# Patient Record
Sex: FEMALE | Race: WHITE | NOT HISPANIC OR LATINO | Employment: OTHER | URBAN - NONMETROPOLITAN AREA
[De-identification: names, ages, dates, MRNs, and addresses within clinical notes are randomized per-mention and may not be internally consistent; named-entity substitution may affect disease eponyms.]

---

## 2019-10-23 ENCOUNTER — NON-PROVIDER VISIT (OUTPATIENT)
Dept: MEDICAL GROUP | Facility: CLINIC | Age: 74
End: 2019-10-23
Payer: COMMERCIAL

## 2019-10-23 DIAGNOSIS — H33.051 TOTAL RETINAL DETACHMENT OF RIGHT EYE: ICD-10-CM

## 2019-10-23 PROCEDURE — 36415 COLL VENOUS BLD VENIPUNCTURE: CPT | Performed by: FAMILY MEDICINE

## 2019-10-23 PROCEDURE — 99000 SPECIMEN HANDLING OFFICE-LAB: CPT | Performed by: FAMILY MEDICINE

## 2019-12-04 ENCOUNTER — NON-PROVIDER VISIT (OUTPATIENT)
Dept: MEDICAL GROUP | Facility: CLINIC | Age: 74
End: 2019-12-04
Payer: COMMERCIAL

## 2019-12-04 PROCEDURE — 36415 COLL VENOUS BLD VENIPUNCTURE: CPT | Performed by: FAMILY MEDICINE

## 2019-12-04 PROCEDURE — 99000 SPECIMEN HANDLING OFFICE-LAB: CPT | Performed by: FAMILY MEDICINE

## 2023-07-23 ENCOUNTER — TELEPHONE (OUTPATIENT)
Dept: CARDIOLOGY | Facility: MEDICAL CENTER | Age: 78
End: 2023-07-23
Payer: COMMERCIAL

## 2023-07-24 ENCOUNTER — HOSPITAL ENCOUNTER (OUTPATIENT)
Dept: RADIOLOGY | Facility: MEDICAL CENTER | Age: 78
End: 2023-07-24

## 2023-07-24 ENCOUNTER — HOSPITAL ENCOUNTER (OUTPATIENT)
Facility: MEDICAL CENTER | Age: 78
End: 2023-07-27
Attending: EMERGENCY MEDICINE | Admitting: INTERNAL MEDICINE
Payer: COMMERCIAL

## 2023-07-24 ENCOUNTER — APPOINTMENT (OUTPATIENT)
Dept: RADIOLOGY | Facility: MEDICAL CENTER | Age: 78
End: 2023-07-24
Payer: COMMERCIAL

## 2023-07-24 ENCOUNTER — APPOINTMENT (OUTPATIENT)
Dept: RADIOLOGY | Facility: MEDICAL CENTER | Age: 78
End: 2023-07-24
Attending: EMERGENCY MEDICINE
Payer: COMMERCIAL

## 2023-07-24 DIAGNOSIS — R20.0 LEFT SIDED NUMBNESS: ICD-10-CM

## 2023-07-24 DIAGNOSIS — R79.89 ELEVATED TROPONIN: ICD-10-CM

## 2023-07-24 DIAGNOSIS — I24.89 DEMAND ISCHEMIA (HCC): ICD-10-CM

## 2023-07-24 PROBLEM — I10 HYPERTENSION: Status: ACTIVE | Noted: 2023-07-24

## 2023-07-24 PROBLEM — E78.5 HYPERLIPIDEMIA: Status: ACTIVE | Noted: 2023-07-24

## 2023-07-24 PROBLEM — E11.9 DM (DIABETES MELLITUS) (HCC): Status: ACTIVE | Noted: 2023-07-24

## 2023-07-24 LAB
ALBUMIN SERPL BCP-MCNC: 3.9 G/DL (ref 3.2–4.9)
ALBUMIN/GLOB SERPL: 1.2 G/DL
ALP SERPL-CCNC: 64 U/L (ref 30–99)
ALT SERPL-CCNC: 16 U/L (ref 2–50)
ANION GAP SERPL CALC-SCNC: 10 MMOL/L (ref 7–16)
APPEARANCE UR: CLEAR
AST SERPL-CCNC: 28 U/L (ref 12–45)
BACTERIA #/AREA URNS HPF: NEGATIVE /HPF
BASOPHILS # BLD AUTO: 0.5 % (ref 0–1.8)
BASOPHILS # BLD: 0.04 K/UL (ref 0–0.12)
BILIRUB SERPL-MCNC: 0.3 MG/DL (ref 0.1–1.5)
BILIRUB UR QL STRIP.AUTO: NEGATIVE
BUN SERPL-MCNC: 13 MG/DL (ref 8–22)
CALCIUM ALBUM COR SERPL-MCNC: 9.1 MG/DL (ref 8.5–10.5)
CALCIUM SERPL-MCNC: 9 MG/DL (ref 8.5–10.5)
CHLORIDE SERPL-SCNC: 102 MMOL/L (ref 96–112)
CO2 SERPL-SCNC: 25 MMOL/L (ref 20–33)
COLOR UR: YELLOW
CREAT SERPL-MCNC: 0.81 MG/DL (ref 0.5–1.4)
EKG IMPRESSION: NORMAL
EOSINOPHIL # BLD AUTO: 0.11 K/UL (ref 0–0.51)
EOSINOPHIL NFR BLD: 1.3 % (ref 0–6.9)
EPI CELLS #/AREA URNS HPF: NEGATIVE /HPF
ERYTHROCYTE [DISTWIDTH] IN BLOOD BY AUTOMATED COUNT: 50.4 FL (ref 35.9–50)
GFR SERPLBLD CREATININE-BSD FMLA CKD-EPI: 74 ML/MIN/1.73 M 2
GLOBULIN SER CALC-MCNC: 3.3 G/DL (ref 1.9–3.5)
GLUCOSE BLD STRIP.AUTO-MCNC: 158 MG/DL (ref 65–99)
GLUCOSE SERPL-MCNC: 125 MG/DL (ref 65–99)
GLUCOSE UR STRIP.AUTO-MCNC: NEGATIVE MG/DL
HCT VFR BLD AUTO: 41.5 % (ref 37–47)
HGB BLD-MCNC: 13.2 G/DL (ref 12–16)
HYALINE CASTS #/AREA URNS LPF: ABNORMAL /LPF
IMM GRANULOCYTES # BLD AUTO: 0.02 K/UL (ref 0–0.11)
IMM GRANULOCYTES NFR BLD AUTO: 0.2 % (ref 0–0.9)
KETONES UR STRIP.AUTO-MCNC: NEGATIVE MG/DL
LEUKOCYTE ESTERASE UR QL STRIP.AUTO: NEGATIVE
LYMPHOCYTES # BLD AUTO: 2.21 K/UL (ref 1–4.8)
LYMPHOCYTES NFR BLD: 26.8 % (ref 22–41)
MCH RBC QN AUTO: 29.9 PG (ref 27–33)
MCHC RBC AUTO-ENTMCNC: 31.8 G/DL (ref 32.2–35.5)
MCV RBC AUTO: 93.9 FL (ref 81.4–97.8)
MICRO URNS: ABNORMAL
MONOCYTES # BLD AUTO: 0.48 K/UL (ref 0–0.85)
MONOCYTES NFR BLD AUTO: 5.8 % (ref 0–13.4)
NEUTROPHILS # BLD AUTO: 5.4 K/UL (ref 1.82–7.42)
NEUTROPHILS NFR BLD: 65.4 % (ref 44–72)
NITRITE UR QL STRIP.AUTO: NEGATIVE
NRBC # BLD AUTO: 0 K/UL
NRBC BLD-RTO: 0 /100 WBC (ref 0–0.2)
PH UR STRIP.AUTO: 5.5 [PH] (ref 5–8)
PLATELET # BLD AUTO: 226 K/UL (ref 164–446)
PMV BLD AUTO: 11 FL (ref 9–12.9)
POTASSIUM SERPL-SCNC: 4.1 MMOL/L (ref 3.6–5.5)
PROT SERPL-MCNC: 7.2 G/DL (ref 6–8.2)
PROT UR QL STRIP: NEGATIVE MG/DL
RBC # BLD AUTO: 4.42 M/UL (ref 4.2–5.4)
RBC # URNS HPF: ABNORMAL /HPF
RBC UR QL AUTO: ABNORMAL
SODIUM SERPL-SCNC: 137 MMOL/L (ref 135–145)
SP GR UR STRIP.AUTO: 1.01
TROPONIN T SERPL-MCNC: 44 NG/L (ref 6–19)
TROPONIN T SERPL-MCNC: 44 NG/L (ref 6–19)
UROBILINOGEN UR STRIP.AUTO-MCNC: 0.2 MG/DL
WBC # BLD AUTO: 8.3 K/UL (ref 4.8–10.8)
WBC #/AREA URNS HPF: ABNORMAL /HPF

## 2023-07-24 PROCEDURE — 93005 ELECTROCARDIOGRAM TRACING: CPT | Performed by: EMERGENCY MEDICINE

## 2023-07-24 PROCEDURE — A9270 NON-COVERED ITEM OR SERVICE: HCPCS | Performed by: STUDENT IN AN ORGANIZED HEALTH CARE EDUCATION/TRAINING PROGRAM

## 2023-07-24 PROCEDURE — 36415 COLL VENOUS BLD VENIPUNCTURE: CPT

## 2023-07-24 PROCEDURE — 82962 GLUCOSE BLOOD TEST: CPT

## 2023-07-24 PROCEDURE — G0378 HOSPITAL OBSERVATION PER HR: HCPCS

## 2023-07-24 PROCEDURE — 96372 THER/PROPH/DIAG INJ SC/IM: CPT

## 2023-07-24 PROCEDURE — 93005 ELECTROCARDIOGRAM TRACING: CPT

## 2023-07-24 PROCEDURE — 99222 1ST HOSP IP/OBS MODERATE 55: CPT | Performed by: INTERNAL MEDICINE

## 2023-07-24 PROCEDURE — 700111 HCHG RX REV CODE 636 W/ 250 OVERRIDE (IP): Performed by: STUDENT IN AN ORGANIZED HEALTH CARE EDUCATION/TRAINING PROGRAM

## 2023-07-24 PROCEDURE — 99285 EMERGENCY DEPT VISIT HI MDM: CPT

## 2023-07-24 PROCEDURE — 81001 URINALYSIS AUTO W/SCOPE: CPT

## 2023-07-24 PROCEDURE — 85025 COMPLETE CBC W/AUTO DIFF WBC: CPT

## 2023-07-24 PROCEDURE — 700102 HCHG RX REV CODE 250 W/ 637 OVERRIDE(OP)

## 2023-07-24 PROCEDURE — 84484 ASSAY OF TROPONIN QUANT: CPT

## 2023-07-24 PROCEDURE — 99223 1ST HOSP IP/OBS HIGH 75: CPT | Mod: GC | Performed by: INTERNAL MEDICINE

## 2023-07-24 PROCEDURE — 700102 HCHG RX REV CODE 250 W/ 637 OVERRIDE(OP): Performed by: STUDENT IN AN ORGANIZED HEALTH CARE EDUCATION/TRAINING PROGRAM

## 2023-07-24 PROCEDURE — 80053 COMPREHEN METABOLIC PANEL: CPT

## 2023-07-24 PROCEDURE — A9270 NON-COVERED ITEM OR SERVICE: HCPCS

## 2023-07-24 RX ORDER — ATORVASTATIN CALCIUM 40 MG/1
40 TABLET, FILM COATED ORAL EVERY EVENING
Status: DISCONTINUED | OUTPATIENT
Start: 2023-07-24 | End: 2023-07-27 | Stop reason: HOSPADM

## 2023-07-24 RX ORDER — PIOGLITAZONEHYDROCHLORIDE 30 MG/1
30 TABLET ORAL DAILY
COMMUNITY

## 2023-07-24 RX ORDER — ONDANSETRON 4 MG/1
4 TABLET, ORALLY DISINTEGRATING ORAL EVERY 4 HOURS PRN
Status: DISCONTINUED | OUTPATIENT
Start: 2023-07-24 | End: 2023-07-27 | Stop reason: HOSPADM

## 2023-07-24 RX ORDER — RAMIPRIL 5 MG/1
10 CAPSULE ORAL
Status: DISCONTINUED | OUTPATIENT
Start: 2023-07-24 | End: 2023-07-27 | Stop reason: HOSPADM

## 2023-07-24 RX ORDER — AMLODIPINE BESYLATE 2.5 MG/1
2.5 TABLET ORAL
Status: DISCONTINUED | OUTPATIENT
Start: 2023-07-24 | End: 2023-07-27 | Stop reason: HOSPADM

## 2023-07-24 RX ORDER — MULTIVIT WITH MINERALS/LUTEIN
1000 TABLET ORAL DAILY
COMMUNITY

## 2023-07-24 RX ORDER — BISACODYL 10 MG
10 SUPPOSITORY, RECTAL RECTAL
Status: DISCONTINUED | OUTPATIENT
Start: 2023-07-24 | End: 2023-07-27 | Stop reason: HOSPADM

## 2023-07-24 RX ORDER — LORATADINE 10 MG/1
10 TABLET ORAL DAILY
COMMUNITY

## 2023-07-24 RX ORDER — ONDANSETRON 2 MG/ML
4 INJECTION INTRAMUSCULAR; INTRAVENOUS EVERY 4 HOURS PRN
Status: DISCONTINUED | OUTPATIENT
Start: 2023-07-24 | End: 2023-07-27 | Stop reason: HOSPADM

## 2023-07-24 RX ORDER — HYDROCHLOROTHIAZIDE 25 MG/1
25 TABLET ORAL
Status: DISCONTINUED | OUTPATIENT
Start: 2023-07-24 | End: 2023-07-27 | Stop reason: HOSPADM

## 2023-07-24 RX ORDER — HEPARIN SODIUM 5000 [USP'U]/ML
5000 INJECTION, SOLUTION INTRAVENOUS; SUBCUTANEOUS EVERY 8 HOURS
Status: DISCONTINUED | OUTPATIENT
Start: 2023-07-24 | End: 2023-07-27 | Stop reason: HOSPADM

## 2023-07-24 RX ORDER — LEVOTHYROXINE SODIUM 0.03 MG/1
25 TABLET ORAL
COMMUNITY

## 2023-07-24 RX ORDER — SIMVASTATIN 20 MG
20 TABLET ORAL NIGHTLY
Status: ON HOLD | COMMUNITY
End: 2023-07-27

## 2023-07-24 RX ORDER — LABETALOL HYDROCHLORIDE 5 MG/ML
10 INJECTION, SOLUTION INTRAVENOUS EVERY 4 HOURS PRN
Status: DISCONTINUED | OUTPATIENT
Start: 2023-07-24 | End: 2023-07-24

## 2023-07-24 RX ORDER — GLIPIZIDE 5 MG/1
5 TABLET ORAL 2 TIMES DAILY
COMMUNITY

## 2023-07-24 RX ORDER — ASPIRIN 81 MG/1
81 TABLET ORAL DAILY
Status: DISCONTINUED | OUTPATIENT
Start: 2023-07-24 | End: 2023-07-27

## 2023-07-24 RX ORDER — RAMIPRIL 10 MG/1
10 CAPSULE ORAL
COMMUNITY

## 2023-07-24 RX ORDER — ASPIRIN 81 MG/1
81 TABLET ORAL
COMMUNITY

## 2023-07-24 RX ORDER — POLYETHYLENE GLYCOL 3350 17 G/17G
1 POWDER, FOR SOLUTION ORAL
Status: DISCONTINUED | OUTPATIENT
Start: 2023-07-24 | End: 2023-07-27 | Stop reason: HOSPADM

## 2023-07-24 RX ORDER — ACETAMINOPHEN 325 MG/1
650 TABLET ORAL EVERY 6 HOURS PRN
Status: DISCONTINUED | OUTPATIENT
Start: 2023-07-24 | End: 2023-07-27 | Stop reason: HOSPADM

## 2023-07-24 RX ORDER — INSULIN LISPRO 100 [IU]/ML
2-9 INJECTION, SOLUTION INTRAVENOUS; SUBCUTANEOUS
Status: DISCONTINUED | OUTPATIENT
Start: 2023-07-24 | End: 2023-07-27 | Stop reason: HOSPADM

## 2023-07-24 RX ORDER — AMOXICILLIN 250 MG
2 CAPSULE ORAL 2 TIMES DAILY
Status: DISCONTINUED | OUTPATIENT
Start: 2023-07-24 | End: 2023-07-27 | Stop reason: HOSPADM

## 2023-07-24 RX ORDER — M-VIT,TX,IRON,MINS/CALC/FOLIC 27MG-0.4MG
1 TABLET ORAL DAILY
COMMUNITY

## 2023-07-24 RX ORDER — HYDRALAZINE HYDROCHLORIDE 20 MG/ML
10 INJECTION INTRAMUSCULAR; INTRAVENOUS EVERY 6 HOURS PRN
Status: DISCONTINUED | OUTPATIENT
Start: 2023-07-24 | End: 2023-07-27 | Stop reason: HOSPADM

## 2023-07-24 RX ORDER — VITAMIN B COMPLEX
1000 TABLET ORAL DAILY
COMMUNITY

## 2023-07-24 RX ORDER — TIMOLOL MALEATE 5 MG/ML
1 SOLUTION/ DROPS OPHTHALMIC 2 TIMES DAILY
COMMUNITY

## 2023-07-24 RX ORDER — HYDROCHLOROTHIAZIDE 25 MG/1
25 TABLET ORAL DAILY
COMMUNITY

## 2023-07-24 RX ORDER — DEXTROSE MONOHYDRATE 25 G/50ML
25 INJECTION, SOLUTION INTRAVENOUS
Status: DISCONTINUED | OUTPATIENT
Start: 2023-07-24 | End: 2023-07-27 | Stop reason: HOSPADM

## 2023-07-24 RX ADMIN — HEPARIN SODIUM 5000 UNITS: 5000 INJECTION, SOLUTION INTRAVENOUS; SUBCUTANEOUS at 21:35

## 2023-07-24 RX ADMIN — ASPIRIN 81 MG: 81 TABLET, COATED ORAL at 15:33

## 2023-07-24 RX ADMIN — AMLODIPINE BESYLATE 2.5 MG: 5 TABLET ORAL at 15:45

## 2023-07-24 RX ADMIN — RAMIPRIL 10 MG: 5 CAPSULE ORAL at 15:28

## 2023-07-24 RX ADMIN — HYDROCHLOROTHIAZIDE 25 MG: 25 TABLET ORAL at 15:28

## 2023-07-24 RX ADMIN — ATORVASTATIN CALCIUM 40 MG: 40 TABLET, FILM COATED ORAL at 17:57

## 2023-07-24 RX ADMIN — HEPARIN SODIUM 5000 UNITS: 5000 INJECTION, SOLUTION INTRAVENOUS; SUBCUTANEOUS at 15:28

## 2023-07-24 ASSESSMENT — PATIENT HEALTH QUESTIONNAIRE - PHQ9
SUM OF ALL RESPONSES TO PHQ9 QUESTIONS 1 AND 2: 0
2. FEELING DOWN, DEPRESSED, IRRITABLE, OR HOPELESS: NOT AT ALL
1. LITTLE INTEREST OR PLEASURE IN DOING THINGS: NOT AT ALL
1. LITTLE INTEREST OR PLEASURE IN DOING THINGS: NOT AT ALL
SUM OF ALL RESPONSES TO PHQ9 QUESTIONS 1 AND 2: 0
2. FEELING DOWN, DEPRESSED, IRRITABLE, OR HOPELESS: NOT AT ALL

## 2023-07-24 ASSESSMENT — LIFESTYLE VARIABLES
EVER FELT BAD OR GUILTY ABOUT YOUR DRINKING: NO
HAVE YOU EVER FELT YOU SHOULD CUT DOWN ON YOUR DRINKING: NO
DOES PATIENT WANT TO STOP DRINKING: NO
TOTAL SCORE: 0
CONSUMPTION TOTAL: NEGATIVE
ALCOHOL_USE: NO
TOTAL SCORE: 0
DO YOU DRINK ALCOHOL: NO
TOTAL SCORE: 0
EVER HAD A DRINK FIRST THING IN THE MORNING TO STEADY YOUR NERVES TO GET RID OF A HANGOVER: NO
ON A TYPICAL DAY WHEN YOU DRINK ALCOHOL HOW MANY DRINKS DO YOU HAVE: 0
SUBSTANCE_ABUSE: 0
HAVE PEOPLE ANNOYED YOU BY CRITICIZING YOUR DRINKING: NO
HOW MANY TIMES IN THE PAST YEAR HAVE YOU HAD 5 OR MORE DRINKS IN A DAY: 0
AVERAGE NUMBER OF DAYS PER WEEK YOU HAVE A DRINK CONTAINING ALCOHOL: 0

## 2023-07-24 ASSESSMENT — ENCOUNTER SYMPTOMS
COUGH: 0
HEADACHES: 0
WHEEZING: 0
NERVOUS/ANXIOUS: 1
DIAPHORESIS: 0
DIZZINESS: 0
SPEECH CHANGE: 0
ABDOMINAL PAIN: 0
CHILLS: 0
ORTHOPNEA: 0
NAUSEA: 0
FEVER: 0
WEIGHT LOSS: 0
FOCAL WEAKNESS: 0
PALPITATIONS: 0
SENSORY CHANGE: 1
SHORTNESS OF BREATH: 0
VOMITING: 0
BLOOD IN STOOL: 0
WEAKNESS: 0
MYALGIAS: 0
PHOTOPHOBIA: 0

## 2023-07-24 ASSESSMENT — COGNITIVE AND FUNCTIONAL STATUS - GENERAL
MOBILITY SCORE: 22
STANDING UP FROM CHAIR USING ARMS: A LITTLE
DAILY ACTIVITIY SCORE: 23
TOILETING: A LITTLE
SUGGESTED CMS G CODE MODIFIER MOBILITY: CJ
SUGGESTED CMS G CODE MODIFIER DAILY ACTIVITY: CI
CLIMB 3 TO 5 STEPS WITH RAILING: A LITTLE

## 2023-07-24 ASSESSMENT — PAIN DESCRIPTION - PAIN TYPE: TYPE: ACUTE PAIN

## 2023-07-24 ASSESSMENT — FIBROSIS 4 INDEX
FIB4 SCORE: 2.42
FIB4 SCORE: 2.42

## 2023-07-24 NOTE — ASSESSMENT & PLAN NOTE
Patient had elevated troponin at outside hospital and her initial troponin here was elevated at 44.  Her initial blood pressures in the ED were constantly elevated above 180 for systolic. This is most likely as a result of demand ischemia from hypertensive emergency. Her EKG demonstrated no ischemic changes. Patient also complains of left sided numbness but her neurological exam is unremarkable and outside head CT and CT angio neck were negative for acute processes. This is most likely as a result of her elevated blood pressures but we will get an MRI brain to rule out a thalamic stroke.   - MRI brain to rule out thalamic infarct   - Hydralazine 10mg PRN for systolic blood pressures over 180  - Her home blood pressure regimen includes ramipril and HCTZ. We will continue those and also start amlodipine 2.5mg.   - Atorvastatin and aspirin   - She reports exertional dyspnea after walking for short distances. Echo demonstrated normal systolic function with mild diastolic dysfunction.   - Cardiology consulted and they do not believe this is concerning for ACS and have signed off.

## 2023-07-24 NOTE — TELEPHONE ENCOUNTER
[facility] CARDIOLOGY TRIAGE REPORT  Facility: [place], [place]  Physician: John Henao MD, ERP  Pertinent history & patient course: 78-year-old female from [facility] with hypertension, dyslipidemia (on simvastatin), diabetes mellitus and obesity presenting with left-sided paresthesias with associated symptoms into her left arm and left leg.  No typical ischemic angina pectoris or shortness of breath symptoms.  In the ER /80.  Room air O2 sat normal.  Hemoglobin 15.  BUN 16.  Creatinine 1.02.  Troponin 0.06 and 0.08 (normal 0-0.028.  EKG showed sinus rhythm, rate 66, low voltage precordial leads otherwise unremarkabl with no ischemic abnormalities.  The patient received aspirin in the ER.  Currently having no symptoms.  It was not felt heparin was immediately necessary.    Discussed treatment plan with Dr. Henao.  It was felt that the patient did not need to be transferred at this time and was clinically stable enough to remain at [facility] facility for further diagnostic cardiac evaluation including MPI and echocardiogram.  In the interim recommended aggressive optimization of coronary risk factors including vigorous blood pressure control (ACE or ARB), transitioning simvastatin to rosuvastatin 20 or 40 mg or atorvastatin 40 or 80 mg and adding SGLT2 inhibitor therapy for optimal diabetic management and cardioprotection.    It was agreed that if the patient medically deteriorates from a cardiac standpoint or if her noninvasive diagnostic evaluation is abnormal then the primary team will contact [facility] for further recommendations.     Please inform the cardiologist upon arrival of the patient to [facility].

## 2023-07-24 NOTE — SENIOR ADMIT NOTE
78-year-old female with past medical history hypertension hyperlipidemia seen for subacute onset left-sided numbness including face left arm and left lower extremity.  Patient was seen [facility] work-up essentially negative including CT head and CTA, however patient had elevated troponins that trended up and was found to be in hypertensive urgency/emergency.  Patient was transferred for evaluation of increasing troponins.    Emergency department here patient once again had elevated blood pressures above 220 systolic.  Patient's symptoms had not changed including left-sided numbness but no weakness no pain.     We will admit patient for hypertensive emergency restart home blood pressure medications, as needed hydralazine, as well as low-dose amlodipine additionally.  We will get MRI head to rule out possible though unlikely thalamic stroke.

## 2023-07-24 NOTE — CONSULTS
Cardiology Initial Consult Note    Date of note:    7/24/2023      Consulting Physician: Ranjan Abdullahi M.D.    Name:   Candy Zamarripa     YOB: 1945  Age:   78 y.o.  female   MRN:   [id number]      Reason for Consultation: Elevated troponin    HPI:  Candy Zamarripa is a 78 y.o. female with a history of hypertension and dyslipidemia who was transferred from outside hospital for TIA/stroke and elevated troponin level.    Patient states that she was in her usual state of health until 2 days ago when she had acute onset left-sided numbness but no weakness.  Went to outside hospital.  Unremarkable CT head and CTA head/neck.  Serial troponin levels were checked which showed mild increase and patient transferred for possible NSTEMI.  Denies any chest pain, pressure discomfort.  Main concern is persistent left-sided numbness to the entire left side.  No prior similar episodes.  Prior to this, she denies any exertional anginal symptoms.  Takes ramipril for hypertension which she did not receive at outside hospital.  /115 on presentation.  Denies lightheadedness, dizziness, nausea, abdominal pain or lower extremity edema.      ROS  A complete ROS was performed and is negative except for pertinent positives mentioned in HPI.      No past medical history on file.    No past surgical history on file.      (Not in a hospital admission)    Current Facility-Administered Medications   Medication Dose Route Frequency Provider Last Rate Last Admin    senna-docusate (Pericolace Or Senokot S) 8.6-50 MG per tablet 2 Tablet  2 Tablet Oral BID Rodrigo Schwab M.D.        And    polyethylene glycol/lytes (Miralax) PACKET 1 Packet  1 Packet Oral QDAY PRN Rodrigo Schwab M.D.        And    magnesium hydroxide (Milk Of Magnesia) suspension 30 mL  30 mL Oral QDAY PRN Rodrigo Schwab M.D.        And    bisacodyl (Dulcolax) suppository 10 mg  10 mg Rectal QDAY PRN Rodrigo Schwab M.D.        heparin injection 5,000  Units  5,000 Units Subcutaneous Q8HRS Rodrigo Schwab M.D.   5,000 Units at 07/24/23 1528    acetaminophen (Tylenol) tablet 650 mg  650 mg Oral Q6HRS PRN Rodrigo Schwab M.D.        ondansetron (Zofran) syringe/vial injection 4 mg  4 mg Intravenous Q4HRS PRN Rodrigo Schwab M.D.        ondansetron (Zofran ODT) dispertab 4 mg  4 mg Oral Q4HRS PRN Rodrigo Schwab M.D.        dextrose 50% (D50W) injection 25 g  25 g Intravenous Q15 MIN PRN Rodrigo Schwab M.D.        insulin lispro (HumaLOG,AdmeLOG) injection  2-9 Units Subcutaneous TID AC Rodrigo Schwab M.D.        hydroCHLOROthiazide (Hydrodiuril) tablet 25 mg  25 mg Oral Q DAY Rodrigo Schwab M.D.   25 mg at 07/24/23 1528    ramipril (Altace) capsule 10 mg  10 mg Oral Q DAY Rodrigo Schwab M.D.   10 mg at 07/24/23 1528    hydrALAZINE (Apresoline) injection 10 mg  10 mg Intravenous Q6HRS PRN Issa Kidd M.D.        amLODIPine (Norvasc) tablet 2.5 mg  2.5 mg Oral Q DAY Issa Kidd M.D.        atorvastatin (Lipitor) tablet 40 mg  40 mg Oral Q EVENING Issa Kidd M.D.        aspirin EC tablet 81 mg  81 mg Oral DAILY Issa Kidd M.D.   81 mg at 07/24/23 1533     Current Outpatient Medications   Medication Sig Dispense Refill    loratadine (CLARITIN) 10 MG Tab Take 10 mg by mouth every day.      therapeutic multivitamin-minerals (THERAGRAN-M) Tab Take 1 Tablet by mouth every day.      pioglitazone (ACTOS) 30 MG Tab Take 30 mg by mouth every day.      Ascorbic Acid (VITAMIN C) 1000 MG Tab Take 1,000 mg by mouth every day.      metformin (GLUCOPHAGE) 1000 MG tablet Take 1,000 mg by mouth 2 times a day.      hydroCHLOROthiazide (HYDRODIURIL) 25 MG Tab Take 25 mg by mouth every day.      simvastatin (ZOCOR) 20 MG Tab Take 20 mg by mouth every evening.      ramipril (ALTACE) 10 MG capsule Take 10 mg by mouth at bedtime.      aspirin 81 MG EC tablet Take 81 mg by mouth at bedtime.      vitamin D3 (CHOLECALCIFEROL) 1000 Unit (25 mcg) Tab Take 1,000 Units by mouth  "every day.      levothyroxine (SYNTHROID) 25 MCG Tab Take 25 mcg by mouth every morning on an empty stomach.      glipiZIDE (GLUCOTROL) 5 MG Tab Take 5 mg by mouth 2 times a day.      timolol (TIMOPTIC) 0.5 % Solution Administer 1 Drop into both eyes 2 times a day.           Allergies   Allergen Reactions    Meperidine Rash               Family history: Father had MI and CABG in his 50s.  Was a smoker.      Social history: Never smoker      No intake or output data in the 24 hours ending 07/24/23 1535     Physical Exam  Body mass index is 56.68 kg/m².  BP (!) 167/72   Pulse 65   Temp 36.4 °C (97.5 °F) (Temporal)   Resp 16   Ht 1.549 m (5' 1\")   Wt (!) 136 kg (300 lb)   SpO2 95%   Vitals:    07/24/23 1315 07/24/23 1400 07/24/23 1448 07/24/23 1515   BP: (!) 171/73 (!) 184/125 (!) 171/73 (!) 167/72   Pulse: 62 72 65 65   Resp: (!) 11 12 14 16   Temp:       TempSrc:       SpO2: 92% 94% 97% 95%   Weight:       Height:         Oxygen Therapy:  Pulse Oximetry: 95 %, O2 (LPM): 0, O2 Delivery Device: None - Room Air    General: Well appearing and in no apparent distress  Eyes: nl conjunctiva  ENT: OP clear  Neck: JVP not elevated,  no carotid bruits  Lungs: normal respiratory effort, CTAB  Heart: RRR, no murmurs, no rubs or gallops, no edema bilateral lower extremities. No LV/RV heave on cardiac palpatation. 2+ bilateral radial pulses.  2+ bilateral dp pulses.   Abdomen: soft, non tender, non distended, no masses, normal bowel sounds.  No HSM.  Extremities/MSK: no clubbing, no cyanosis  Neurological: No focal sensory deficits  Psychiatric: Appropriate affect, A/O x 3  Skin: Warm extremities      Labs (personally reviewed and notable for):   Lab Results   Component Value Date/Time    SODIUM 137 07/24/2023 12:50 PM    POTASSIUM 4.1 07/24/2023 12:50 PM    CHLORIDE 102 07/24/2023 12:50 PM    CO2 25 07/24/2023 12:50 PM    GLUCOSE 125 (H) 07/24/2023 12:50 PM    BUN 13 07/24/2023 12:50 PM    CREATININE 0.81 07/24/2023 12:50 " PM      Lab Results   Component Value Date/Time    WBC 8.3 07/24/2023 12:50 PM    RBC 4.42 07/24/2023 12:50 PM    HEMOGLOBIN 13.2 07/24/2023 12:50 PM    HEMATOCRIT 41.5 07/24/2023 12:50 PM    MCV 93.9 07/24/2023 12:50 PM    MCH 29.9 07/24/2023 12:50 PM    MCHC 31.8 (L) 07/24/2023 12:50 PM    MPV 11.0 07/24/2023 12:50 PM    NEUTSPOLYS 65.40 07/24/2023 12:50 PM    LYMPHOCYTES 26.80 07/24/2023 12:50 PM    MONOCYTES 5.80 07/24/2023 12:50 PM    EOSINOPHILS 1.30 07/24/2023 12:50 PM    BASOPHILS 0.50 07/24/2023 12:50 PM      No results found for: CHOLSTRLTOT, LDL, HDL, TRIGLYCERIDE    Lab Results   Component Value Date/Time    TROPONINT 44 (H) 07/24/2023 1250     No results found for: NTPROBNP      Cardiac Imaging and Procedures Review:    EKG dated 7/24/2023: My personal interpretation is sinus rhythm        Radiology test Review:  OUTSIDE IMAGES-CT HEAD   Final Result      OUTSIDE IMAGES-CT HEAD   Final Result      OUTSIDE IMAGES-DX CHEST   Final Result      EC-ECHOCARDIOGRAM COMPLETE W/O CONT    (Results Pending)   MR-BRAIN-W/O    (Results Pending)       Problem list:  Principal Problem:    Demand ischemia (HCC) (POA: Yes)  Resolved Problems:    * No resolved hospital problems. *      Impression and Medical Decision Making:  #Hypertensive urgency  #Elevated troponin  #Left-sided numbness    Recommendations:  -- Patient presented to outside hospital with left-sided numbness and neurological symptoms.  Denies chest pain, pressure or anginal equivalent symptoms.  Minimally elevated troponin at 44.  EKG does not show acute ischemic changes.  We discussed that her presentation is less likely consistent with ACS but concerning for TIA/stroke.  -- Recommend brain MRI with ongoing symptoms.  --Recommend aspirin 81 mg daily and statin therapy.  -- Initiate antihypertensive therapy for hypertensive urgency.  -- Recommend repeat troponin level.  If similar, no need for further checks.  No indication for IV heparin drip at this  time.  -- Obtain transthoracic echocardiogram to evaluate underlying cardiac structure and function.    Cardiology will sign off.    Thank you for allowing me to participate in the care of this patient.  Please contact me with any questions or if abnormal echo results.      Julian Antonio M.D.  Cardiologist, [facility]   [phone]    This note was generated using voice recognition software which has a small chance of producing errors of grammar and possibly content. I have made every reasonable attempt to find and correct any obvious errors, but expect that some may not be found prior to finalization of this note.

## 2023-07-24 NOTE — ED NOTES
Pt. Provided with meal tray.  Medicated per MAR.  Pillows for positioning in use.  Pt. Denies other needs at this time.

## 2023-07-24 NOTE — H&P
[facility] Internal Medicine History & Physical Note    Date of Service  7/24/2023    [facility] Team: R IM Green Team   Attending: Ranjan Abdullahi M.d.  Senior Resident: Dr. Schwab  Intern:  Dr. Kidd  Contact Number: [phone]    Primary Care Physician  Pcp Pt States None    Consultants  cardiology    Specialist Names: N/A     Code Status  Full Code    Chief Complaint  Chief Complaint   Patient presents with    Numbness     Left arm numbness/tingling radiating to L face since Saturday at 1130.        History of Presenting Illness (HPI): Candy Zamarripa is a 78 y.o. female who presented 7/24/2023 with subacute onset of left sided numbness. Patient states she had sudden onset numbness on the left side of the body (left side of face, LUE, LLE) on Saturday night. It continued on to Sunday morning so she went to the ED in [place], [place] with her girlfriend. She underwent a head CT and CT angio of her neck which were negative but had elevations in her troponin. She was started on a Heparin drip and transferred for the cardiology service here. Patient denies chest pain, worsening shortness of breath, abdominal pain, or any nausea/vomiting. She continues to feel numbness on the left side of her body including her face, LUE and LLE. She describes it as someone spraying Novocaine on her for a dental procedure. She denies having any weakness, difficulty talking or difficulty swallowing.   Patient reports she has never experienced chest pain in her life or had to she a cardiologist. She states she does experience shortness of breath after walking for a block. She is taking medications for HTN, hyperlipidemia and DMII and states she is compliant with all her medications. Her systolic blood pressures at home usually run in the 120s.   In the ED, patients vitals were: T:97.5, HR:63, RR:15, BP:223/115 and O2 sat: 96% on room air. She had an EKG done which demonstrated sinus rhythm with no ischemic changes. Her troponin was 44,  "glucose was 125 and creatinine was 0.81.     I discussed the plan of care with Dr. Abdullahi.     Review of Systems  Review of Systems   Constitutional:  Negative for chills, diaphoresis, fever and weight loss.   HENT:  Negative for hearing loss.    Eyes:  Negative for photophobia.        Can't see through her right eye since 2019 after having surgery for a \"torn retina\"    Respiratory:  Negative for cough, shortness of breath and wheezing.    Cardiovascular:  Negative for chest pain, palpitations, orthopnea and leg swelling.   Gastrointestinal:  Negative for abdominal pain, blood in stool, melena, nausea and vomiting.   Genitourinary:  Negative for dysuria and hematuria.   Musculoskeletal:  Negative for myalgias.   Skin:  Negative for rash.   Neurological:  Positive for sensory change. Negative for dizziness, speech change, focal weakness, weakness and headaches.   Psychiatric/Behavioral:  Negative for substance abuse. The patient is nervous/anxious.        Past Medical History  HTN, hyperlipidemia and DMII    Surgical History  Reports having surgery for a torn retina in her right eye     Family History  Dad had MI and stroke     Social History  Tobacco: Denies  Alcohol: Denies  Recreational drugs (illegal or prescription): Denies  Employment: Retired  Living Situation: Lives in [place], [place] by herself  Recent Travel: Denies  Primary Care Provider: States she has one but doesn't remember name   Other (stressors, spirituality, exposures): Ex- passed away in April    Allergies  Allergies   Allergen Reactions    Meperidine Rash             Medications  Prior to Admission Medications   Prescriptions Last Dose Informant Patient Reported? Taking?   Ascorbic Acid (VITAMIN C) 1000 MG Tab 7/24/2023 at 0800 Patient Yes Yes   Sig: Take 1,000 mg by mouth every day.   aspirin 81 MG EC tablet 7/23/2023 at 2000 Patient Yes Yes   Sig: Take 81 mg by mouth at bedtime.   glipiZIDE (GLUCOTROL) 5 MG Tab 7/24/2023 at 0800 Patient Yes " Yes   Sig: Take 5 mg by mouth 2 times a day.   hydroCHLOROthiazide (HYDRODIURIL) 25 MG Tab 7/24/2023 at 0800 Patient Yes Yes   Sig: Take 25 mg by mouth every day.   levothyroxine (SYNTHROID) 25 MCG Tab 7/24/2023 at 0700 Patient Yes Yes   Sig: Take 25 mcg by mouth every morning on an empty stomach.   loratadine (CLARITIN) 10 MG Tab 7/24/2023 at 0800 Patient Yes Yes   Sig: Take 10 mg by mouth every day.   metformin (GLUCOPHAGE) 1000 MG tablet 7/24/2023 at 0800 Patient Yes Yes   Sig: Take 1,000 mg by mouth 2 times a day.   pioglitazone (ACTOS) 30 MG Tab 7/24/2023 at 0800 Patient Yes Yes   Sig: Take 30 mg by mouth every day.   ramipril (ALTACE) 10 MG capsule 7/23/2023 at 2000 Patient Yes Yes   Sig: Take 10 mg by mouth at bedtime.   simvastatin (ZOCOR) 20 MG Tab 7/23/2023 at 2000 Patient Yes Yes   Sig: Take 20 mg by mouth every evening.   therapeutic multivitamin-minerals (THERAGRAN-M) Tab 7/24/2023 at 0800 Patient Yes Yes   Sig: Take 1 Tablet by mouth every day.   timolol (TIMOPTIC) 0.5 % Solution 7/24/2023 at 0800 Patient Yes Yes   Sig: Administer 1 Drop into both eyes 2 times a day.   vitamin D3 (CHOLECALCIFEROL) 1000 Unit (25 mcg) Tab 7/24/2023 at 0800 Patient Yes Yes   Sig: Take 1,000 Units by mouth every day.      Facility-Administered Medications: None       Physical Exam  Temp:  [36.4 °C (97.5 °F)] 36.4 °C (97.5 °F)  Pulse:  [58-72] 65  Resp:  [11-20] 14  BP: (157-223)/() 171/73  SpO2:  [92 %-97 %] 97 %  Blood Pressure : (!) 171/73   Temperature: 36.4 °C (97.5 °F)   Pulse: 65   Respiration: 14   Pulse Oximetry: 97 %       Physical Exam  Constitutional:       General: She is not in acute distress.     Appearance: She is obese. She is not ill-appearing.   HENT:      Head: Normocephalic and atraumatic.      Nose: Nose normal.      Mouth/Throat:      Mouth: Mucous membranes are moist.   Eyes:      General: No scleral icterus.        Right eye: No discharge.         Left eye: No discharge.      Extraocular  Movements: Extraocular movements intact.      Pupils: Pupils are equal, round, and reactive to light.   Cardiovascular:      Rate and Rhythm: Normal rate and regular rhythm.      Pulses: Normal pulses.      Heart sounds: Normal heart sounds.   Pulmonary:      Effort: Pulmonary effort is normal.      Breath sounds: Normal breath sounds.   Abdominal:      Tenderness: There is no abdominal tenderness. There is no guarding or rebound.   Musculoskeletal:         General: No swelling.      Cervical back: Normal range of motion.      Right lower leg: No edema.      Left lower leg: No edema.   Skin:     General: Skin is warm and dry.      Coloration: Skin is not jaundiced.   Neurological:      General: No focal deficit present.      Mental Status: She is alert and oriented to person, place, and time.      Cranial Nerves: No cranial nerve deficit.      Sensory: No sensory deficit.      Motor: No weakness.      Comments: Sensation is intact on left side of face, LUE, and LLE.  She is able to distinguish temperature, dull vs. Sharp and has intact proprioception.    Psychiatric:      Comments: Nervous          Laboratory:  Recent Labs     07/24/23  1250   WBC 8.3   RBC 4.42   HEMOGLOBIN 13.2   HEMATOCRIT 41.5   MCV 93.9   MCH 29.9   MCHC 31.8*   RDW 50.4*   PLATELETCT 226   MPV 11.0     Recent Labs     07/24/23  1250   SODIUM 137   POTASSIUM 4.1   CHLORIDE 102   CO2 25   GLUCOSE 125*   BUN 13   CREATININE 0.81   CALCIUM 9.0     Recent Labs     07/24/23  1250   ALTSGPT 16   ASTSGOT 28   ALKPHOSPHAT 64   TBILIRUBIN 0.3   GLUCOSE 125*         No results for input(s): NTPROBNP in the last 72 hours.      Recent Labs     07/24/23  1250   TROPONINT 44*       Imaging:  OUTSIDE IMAGES-CT HEAD   Final Result      OUTSIDE IMAGES-CT HEAD   Final Result      OUTSIDE IMAGES-DX CHEST   Final Result      EC-ECHOCARDIOGRAM COMPLETE W/O CONT    (Results Pending)   MR-BRAIN-W/O    (Results Pending)         Assessment/Plan:  Problem  Representation: Patient is  a 79yo female with a PMHx of HTN, hyperlipidemia and DMII who presents for subacute onset of left sided numbness. She has an unremarkable neuro exam and head CT and CT angio neck were negative at an outside facility. Her systolic blood pressures have been elevated to the 200s in the ED. This is most likely a manifestation of hypertensive emergency.   I anticipate this patient is appropriate for observation status at this time because there is low likelihood for ACS or stroke at this point but will require management of hypertensive emergency.    Patient will need a Med/Surg bed on MEDICAL service .  The need is secondary to management of hypertensive emergency.    * Demand ischemia (HCC)- (present on admission)  Assessment & Plan  Patient had elevated troponin at outside hospital and her initial troponin here was elevated at 44.  Her initial blood pressures in the ED were constantly elevated above 180 for systolic. This is most likely as a result of demand ischemia from hypertensive emergency. Her EKG demonstrated no ischemic changes. Patient also complains of left sided numbness but her neurological exam is unremarkable and outside head CT and CT angio neck were negative for acute processes. This is most likely as a result of her elevated blood pressures but we will get an MRI brain to rule out a thalamic stroke.   - MRI brain to rule out thalamic infarct   - Hydralazine 10mg PRN for systolic blood pressures over 180  - Her home blood pressure regimen includes ramipril and HCTZ. We will continue those and also start amlodipine 2.5mg.   - Atorvastatin and aspirin   - She reports exertional dyspnea after walking for short distances. We will evaluate with an echo to assess ventricular function.   - Cardiology consulted. Appreciate their recs.     Hyperlipidemia  Assessment & Plan  - Atorvastatin 40mg   - Lipid panel in am     DM (diabetes mellitus) (HCC)  Assessment & Plan  Will hold home  metformin, glipizide and pioglitazone.  - Start patient on SSI  - HgA1c in am    Hypertension  Assessment & Plan  Patient had hypertensive emergency in the ED. Her systolic blood pressures have been better controlled after addition of her home regimen.   - Hydralazine 10mg PRN for systolic BP over 180  - Continue home ramipril and HCTZ. Adding amlodipine 2.5mg.   - Her creatine was 0.80 upon admission but we will get UA to assess for any proteinuria         VTE prophylaxis: heparin ppx

## 2023-07-24 NOTE — ED NOTES
Pt is A&Ox4 and awake in bed. Pt placed on cardiac monitor, pulse ox, and automatic BP. VSS, saturating above 90% on RA, SR in the 60s. Call light within reach and chart up for ERP.

## 2023-07-24 NOTE — ED TRIAGE NOTES
"Chief Complaint   Patient presents with    Numbness     Left arm numbness/tingling radiating to L face since Saturday at 1130.      Pt BIB EMS from [facility]. Pt has history of HTN, HLD, DM, and hypothyroid. Saturday at approximately 1130, pt started experiencing L arm numbness/tingling radiating to L face. EMS was called to home and pt was reportedly cleared by EMS and stayed home. Pt states numbness/tingling was still present Sunday, so she presented to [facility]. CT/CTA were negative but pt had elevated troponins, 0.06 to 0.08 to 0.35. Pt denies chest pain and shortness of breath.    Pt arrives with heparin gtt running at 14u/kg/hr with a weight of 80.06 kg.     BP (!) 223/115   Pulse 63   Temp 36.4 °C (97.5 °F) (Temporal)   Resp 15   Ht 1.549 m (5' 1\")   Wt (!) 136 kg (300 lb)   SpO2 96%   BMI 56.68 kg/m²     "

## 2023-07-24 NOTE — ED PROVIDER NOTES
ED Provider Note    CHIEF COMPLAINT  Chief Complaint   Patient presents with    Numbness     Left arm numbness/tingling radiating to L face since Saturday at 1130.        EXTERNAL RECORDS REVIEWED  External ED Note regarding work-up at the [facility], negative CT angiogram of head and neck, rising troponins    HPI/ROS  LIMITATION TO HISTORY   Select: : None  OUTSIDE HISTORIAN(S):  EMS run sheet with data on transfer    Candy Zamarripa is a 78 y.o. female who presents transferred from [facility] for higher level of care.  She presented last evening after approximately day and a half of left-sided numbness.  Onset prior to going to sleep 2 nights ago suddenly.  No associated weakness.  No slurred speech, vision change.  She denies headache.  No trauma.  Patient denies history of either cardiac or stroke previously.  Patient states multiple medical problems including type 2 diabetes, hypertension, high cholesterol, also family history of heart disease.  During her work-up at the other hospital, patient's initial troponin went from 0.06 up to 0.32.  Cardiology was contacted and heparin drip ordered.  Patient denies chest pain.  She states she has had intermittent palpitations for 10 years.  She does not smoke.    PAST MEDICAL HISTORY   Type 2 diabetes, hypertension    SURGICAL HISTORY  patient denies any surgical history    FAMILY HISTORY  No family history on file.  Patient states her father had heart disease    SOCIAL HISTORY  Social History     Tobacco Use    Smoking status: Not on file    Smokeless tobacco: Not on file   Substance and Sexual Activity    Alcohol use: Not on file    Drug use: Not on file    Sexual activity: Not on file       CURRENT MEDICATIONS  Home Medications    **Home medications have not yet been reviewed for this encounter**         ALLERGIES  Allergies   Allergen Reactions    Demerol Hcl [Meperidine]        PHYSICAL EXAM  VITAL SIGNS: BP (!) 181/83   Pulse 60   Temp 36.4 °C  "(97.5 °F) (Temporal)   Resp 15   Ht 1.549 m (5' 1\")   Wt (!) 136 kg (300 lb)   SpO2 94%   BMI 56.68 kg/m²    Constitutional: Well-nourished in no distress  Respiratory: Clear lung sounds  Cardiac: Regular rate and rhythm  GI: Abdomen soft, nontender, no guarding  Skin: No asymmetric edema  Psychiatric: Calm and cooperative  Neurologic: Diminished sensation left face, left arm left leg.  Strength is normal, no drift.  Speech is clear.  Patient is alert and cooperative.  Visual fields intact.  NIH stroke scale score is 1 for diminished sensation left compared to right  Eyes: Pupils are equal, no nystagmus      DIAGNOSTIC STUDIES / PROCEDURES  EKG  I have independently interpreted this EKG  See below    LABS  Results for orders placed or performed during the hospital encounter of 07/24/23   CBC with Differential   Result Value Ref Range    WBC 8.3 4.8 - 10.8 K/uL    RBC 4.42 4.20 - 5.40 M/uL    Hemoglobin 13.2 12.0 - 16.0 g/dL    Hematocrit 41.5 37.0 - 47.0 %    MCV 93.9 81.4 - 97.8 fL    MCH 29.9 27.0 - 33.0 pg    MCHC 31.8 (L) 32.2 - 35.5 g/dL    RDW 50.4 (H) 35.9 - 50.0 fL    Platelet Count 226 164 - 446 K/uL    MPV 11.0 9.0 - 12.9 fL    Neutrophils-Polys 65.40 44.00 - 72.00 %    Lymphocytes 26.80 22.00 - 41.00 %    Monocytes 5.80 0.00 - 13.40 %    Eosinophils 1.30 0.00 - 6.90 %    Basophils 0.50 0.00 - 1.80 %    Immature Granulocytes 0.20 0.00 - 0.90 %    Nucleated RBC 0.00 0.00 - 0.20 /100 WBC    Neutrophils (Absolute) 5.40 1.82 - 7.42 K/uL    Lymphs (Absolute) 2.21 1.00 - 4.80 K/uL    Monos (Absolute) 0.48 0.00 - 0.85 K/uL    Eos (Absolute) 0.11 0.00 - 0.51 K/uL    Baso (Absolute) 0.04 0.00 - 0.12 K/uL    Immature Granulocytes (abs) 0.02 0.00 - 0.11 K/uL    NRBC (Absolute) 0.00 K/uL   Complete Metabolic Panel (CMP)   Result Value Ref Range    Sodium 137 135 - 145 mmol/L    Potassium 4.1 3.6 - 5.5 mmol/L    Chloride 102 96 - 112 mmol/L    Co2 25 20 - 33 mmol/L    Anion Gap 10.0 7.0 - 16.0    Glucose 125 (H) " 65 - 99 mg/dL    Bun 13 8 - 22 mg/dL    Creatinine 0.81 0.50 - 1.40 mg/dL    Calcium 9.0 8.5 - 10.5 mg/dL    Correct Calcium 9.1 8.5 - 10.5 mg/dL    AST(SGOT) 28 12 - 45 U/L    ALT(SGPT) 16 2 - 50 U/L    Alkaline Phosphatase 64 30 - 99 U/L    Total Bilirubin 0.3 0.1 - 1.5 mg/dL    Albumin 3.9 3.2 - 4.9 g/dL    Total Protein 7.2 6.0 - 8.2 g/dL    Globulin 3.3 1.9 - 3.5 g/dL    A-G Ratio 1.2 g/dL   Troponins NOW   Result Value Ref Range    Troponin T 44 (H) 6 - 19 ng/L   ESTIMATED GFR   Result Value Ref Range    GFR (CKD-EPI) 74 >60 mL/min/1.73 m 2   EKG   Result Value Ref Range    Report       [facility] Emergency Dept.    Test Date:  2023  Pt Name:    LEROY WELLER           Department: ER  MRN:        [id number]                      Room:       [facility]  Gender:     Female                       Technician: [id number]  :        1945                   Requested By:ER TRIAGE PROTOCOL  Order #:    [id number]                    Reading MD: ISIDRO ALVAREZ MD    Measurements  Intervals                                Axis  Rate:       61                           P:          24  VA:         174                          QRS:        8  QRSD:       97                           T:          28  QT:         446  QTc:        450    Interpretive Statements  Sinus rhythm  Low voltage, precordial leads  No previous ECG available for comparison  No ischemia, no arrhythmia  Electronically Signed On 2023 13:34:28 PDT by ISIDRO ALVAREZ MD           RADIOLOGY    Radiologist interpretation: CT angiogram of head and neck from the [facility] negative for acute findings, no hemorrhage, no arterial occlusion or dissection, please refer to their final reading    COURSE & MEDICAL DECISION MAKING    ED Observation Status? No; Patient does not meet criteria for ED Observation.     INITIAL ASSESSMENT, COURSE AND PLAN  Care Narrative: Patient presents transfer from [facility], had rising  troponins at the other facility.  Here troponin returns at 44.  The heparin drip had been continued after Case discussed with Dr. Spenser Clarke on-call for cardiology.  I have requested their consultation on the patient.  The patient is not having chest pain at this time and denies history of chest pain over the last several days.  No diaphoresis, no dizziness.  She does have multiple risk factors for both MI and stroke including family history, high cholesterol, type 2 diabetes, hypertension.  Plan for hospitalization, ongoing evaluation and treatment.  As it involves the face as well as left side of the body, numbness to the left side suspicious for CVA.  Reimaging in the ER was not indicated emergently  HTN/IDDM FOLLOW UP:  The patient has known hypertension and is being followed by their primary care doctor      ADDITIONAL PROBLEM LIST  Left-sided numbness: Suspicious for CVA, admitted for further work-up    Rising troponin: No chest pain, her blood pressure is elevated but slightly improved since arriving.  Cardiology has been consulted, patient has heparin drip at this time.  She is hospitalized for further work-up    Hyperglycemia: History of type 2 diabetes    Hypertension: Treated with IV labetalol    DISPOSITION AND DISCUSSIONS  I have discussed management of the patient with the following physicians and MIRELA's: Admitting hospitalist service, cardiology consultation    Discussion of management with other [facility] or appropriate source(s): Case Management for appropriate bed placement        Decision tools and prescription drugs considered including, but not limited to: NIH Stroke Scale 1 .    FINAL DIAGNOSIS  1. Left sided numbness    2. Elevated troponin           Electronically signed by: Jim Guthrie M.D., 7/24/2023 2:10 PM

## 2023-07-24 NOTE — ED NOTES
Med rec updated and complete. Allergies reviewed. Confirmed name and date of birth.  Interviewed pt at bedside.   Pt denies antibiotic use in last 30 days.        Home pharmacy  [place] = [phone]

## 2023-07-24 NOTE — ASSESSMENT & PLAN NOTE
Patient had hypertensive emergency in the ED. Her systolic blood pressures have been better controlled after addition of her home regimen.   - Hydralazine 10mg PRN for systolic BP over 180  - Continue home ramipril and HCTZ. Adding amlodipine 2.5mg.   - Her creatine was 0.80 upon admission and UA did not show any proteinuria

## 2023-07-24 NOTE — TELEPHONE ENCOUNTER
[facility] CARDIOLOGY TRIAGE REPORT  Facility: [place], [place]  Physician: Bin Castillo MD Hospitalist  Pertinent history & patient course: Follow-up from earlier call regarding this patient.  A follow-up troponin level was 0.2 with previous levels of 0.06, 0.08 (normal 0-0.028).  The patient initially resented with paresthesias of the left face and left side of her body.  She had no chest discomfort or other ischemic symptoms.  EKG was normal.  She has had no new cardiac symptoms.  Head CTA negative for vessel occlusion.  Brain MRI pending for a.m.  Laboratory otherwise unremarkable.  Initial BP was 170/80.  Presentation more consistent with neurological event i.e. TIA as opposed to an acute coronary syndrome despite findings of troponin elevations.  Ultimately decided to continue medical therapy, remain at [facility] and obtain serial troponin levels.  Depending on follow-up troponin levels and clinical course will make further recommendations.     Please inform the cardiologist upon arrival of the patient to [facility].

## 2023-07-24 NOTE — ED NOTES
Report to FELIBERTO East. Pt transported to B14. Pt awake and breathing with even, unlabored respirations on RA at time of transfer.

## 2023-07-25 ENCOUNTER — APPOINTMENT (OUTPATIENT)
Dept: CARDIOLOGY | Facility: MEDICAL CENTER | Age: 78
End: 2023-07-25
Payer: COMMERCIAL

## 2023-07-25 LAB
ALBUMIN SERPL BCP-MCNC: 3.6 G/DL (ref 3.2–4.9)
ALBUMIN/GLOB SERPL: 1.1 G/DL
ALP SERPL-CCNC: 58 U/L (ref 30–99)
ALT SERPL-CCNC: 17 U/L (ref 2–50)
ANION GAP SERPL CALC-SCNC: 9 MMOL/L (ref 7–16)
AST SERPL-CCNC: 22 U/L (ref 12–45)
BASOPHILS # BLD AUTO: 0.4 % (ref 0–1.8)
BASOPHILS # BLD: 0.03 K/UL (ref 0–0.12)
BILIRUB SERPL-MCNC: 0.4 MG/DL (ref 0.1–1.5)
BUN SERPL-MCNC: 12 MG/DL (ref 8–22)
CALCIUM ALBUM COR SERPL-MCNC: 9.2 MG/DL (ref 8.5–10.5)
CALCIUM SERPL-MCNC: 8.9 MG/DL (ref 8.5–10.5)
CHLORIDE SERPL-SCNC: 103 MMOL/L (ref 96–112)
CHOLEST SERPL-MCNC: 142 MG/DL (ref 100–199)
CO2 SERPL-SCNC: 26 MMOL/L (ref 20–33)
CREAT SERPL-MCNC: 0.86 MG/DL (ref 0.5–1.4)
EKG IMPRESSION: NORMAL
EOSINOPHIL # BLD AUTO: 0.16 K/UL (ref 0–0.51)
EOSINOPHIL NFR BLD: 2.3 % (ref 0–6.9)
ERYTHROCYTE [DISTWIDTH] IN BLOOD BY AUTOMATED COUNT: 50.8 FL (ref 35.9–50)
GFR SERPLBLD CREATININE-BSD FMLA CKD-EPI: 69 ML/MIN/1.73 M 2
GLOBULIN SER CALC-MCNC: 3.2 G/DL (ref 1.9–3.5)
GLUCOSE BLD STRIP.AUTO-MCNC: 118 MG/DL (ref 65–99)
GLUCOSE BLD STRIP.AUTO-MCNC: 119 MG/DL (ref 65–99)
GLUCOSE BLD STRIP.AUTO-MCNC: 122 MG/DL (ref 65–99)
GLUCOSE SERPL-MCNC: 128 MG/DL (ref 65–99)
HCT VFR BLD AUTO: 40.5 % (ref 37–47)
HDLC SERPL-MCNC: 66 MG/DL
HGB BLD-MCNC: 13.4 G/DL (ref 12–16)
IMM GRANULOCYTES # BLD AUTO: 0.01 K/UL (ref 0–0.11)
IMM GRANULOCYTES NFR BLD AUTO: 0.1 % (ref 0–0.9)
LDLC SERPL CALC-MCNC: 61 MG/DL
LV EJECT FRACT  99904: 65
LV EJECT FRACT MOD 2C 99903: 68.96
LV EJECT FRACT MOD 4C 99902: 60.5
LV EJECT FRACT MOD BP 99901: 64.59
LYMPHOCYTES # BLD AUTO: 2.08 K/UL (ref 1–4.8)
LYMPHOCYTES NFR BLD: 29.7 % (ref 22–41)
MAGNESIUM SERPL-MCNC: 1.7 MG/DL (ref 1.5–2.5)
MCH RBC QN AUTO: 30.8 PG (ref 27–33)
MCHC RBC AUTO-ENTMCNC: 33.1 G/DL (ref 32.2–35.5)
MCV RBC AUTO: 93.1 FL (ref 81.4–97.8)
MONOCYTES # BLD AUTO: 0.57 K/UL (ref 0–0.85)
MONOCYTES NFR BLD AUTO: 8.1 % (ref 0–13.4)
NEUTROPHILS # BLD AUTO: 4.16 K/UL (ref 1.82–7.42)
NEUTROPHILS NFR BLD: 59.4 % (ref 44–72)
NRBC # BLD AUTO: 0 K/UL
NRBC BLD-RTO: 0 /100 WBC (ref 0–0.2)
PLATELET # BLD AUTO: 215 K/UL (ref 164–446)
PMV BLD AUTO: 10.7 FL (ref 9–12.9)
POTASSIUM SERPL-SCNC: 3.6 MMOL/L (ref 3.6–5.5)
PROT SERPL-MCNC: 6.8 G/DL (ref 6–8.2)
RBC # BLD AUTO: 4.35 M/UL (ref 4.2–5.4)
SODIUM SERPL-SCNC: 138 MMOL/L (ref 135–145)
TRIGL SERPL-MCNC: 76 MG/DL (ref 0–149)
WBC # BLD AUTO: 7 K/UL (ref 4.8–10.8)

## 2023-07-25 PROCEDURE — 93010 ELECTROCARDIOGRAM REPORT: CPT | Performed by: INTERNAL MEDICINE

## 2023-07-25 PROCEDURE — A9270 NON-COVERED ITEM OR SERVICE: HCPCS | Performed by: STUDENT IN AN ORGANIZED HEALTH CARE EDUCATION/TRAINING PROGRAM

## 2023-07-25 PROCEDURE — 99232 SBSQ HOSP IP/OBS MODERATE 35: CPT | Mod: GC | Performed by: INTERNAL MEDICINE

## 2023-07-25 PROCEDURE — 82962 GLUCOSE BLOOD TEST: CPT | Mod: 91

## 2023-07-25 PROCEDURE — 700111 HCHG RX REV CODE 636 W/ 250 OVERRIDE (IP): Performed by: STUDENT IN AN ORGANIZED HEALTH CARE EDUCATION/TRAINING PROGRAM

## 2023-07-25 PROCEDURE — 80053 COMPREHEN METABOLIC PANEL: CPT

## 2023-07-25 PROCEDURE — 93306 TTE W/DOPPLER COMPLETE: CPT | Mod: 26 | Performed by: INTERNAL MEDICINE

## 2023-07-25 PROCEDURE — 83036 HEMOGLOBIN GLYCOSYLATED A1C: CPT

## 2023-07-25 PROCEDURE — 700102 HCHG RX REV CODE 250 W/ 637 OVERRIDE(OP): Performed by: STUDENT IN AN ORGANIZED HEALTH CARE EDUCATION/TRAINING PROGRAM

## 2023-07-25 PROCEDURE — 93306 TTE W/DOPPLER COMPLETE: CPT

## 2023-07-25 PROCEDURE — 96372 THER/PROPH/DIAG INJ SC/IM: CPT

## 2023-07-25 PROCEDURE — 36415 COLL VENOUS BLD VENIPUNCTURE: CPT

## 2023-07-25 PROCEDURE — 80061 LIPID PANEL: CPT

## 2023-07-25 PROCEDURE — A9270 NON-COVERED ITEM OR SERVICE: HCPCS

## 2023-07-25 PROCEDURE — 83735 ASSAY OF MAGNESIUM: CPT

## 2023-07-25 PROCEDURE — 93005 ELECTROCARDIOGRAM TRACING: CPT

## 2023-07-25 PROCEDURE — 700102 HCHG RX REV CODE 250 W/ 637 OVERRIDE(OP)

## 2023-07-25 PROCEDURE — G0378 HOSPITAL OBSERVATION PER HR: HCPCS

## 2023-07-25 PROCEDURE — 85025 COMPLETE CBC W/AUTO DIFF WBC: CPT

## 2023-07-25 RX ADMIN — AMLODIPINE BESYLATE 2.5 MG: 5 TABLET ORAL at 05:06

## 2023-07-25 RX ADMIN — RAMIPRIL 10 MG: 5 CAPSULE ORAL at 05:07

## 2023-07-25 RX ADMIN — ASPIRIN 81 MG: 81 TABLET, COATED ORAL at 05:06

## 2023-07-25 RX ADMIN — HEPARIN SODIUM 5000 UNITS: 5000 INJECTION, SOLUTION INTRAVENOUS; SUBCUTANEOUS at 05:07

## 2023-07-25 RX ADMIN — HEPARIN SODIUM 5000 UNITS: 5000 INJECTION, SOLUTION INTRAVENOUS; SUBCUTANEOUS at 21:28

## 2023-07-25 RX ADMIN — HEPARIN SODIUM 5000 UNITS: 5000 INJECTION, SOLUTION INTRAVENOUS; SUBCUTANEOUS at 14:00

## 2023-07-25 RX ADMIN — HYDROCHLOROTHIAZIDE 25 MG: 25 TABLET ORAL at 05:06

## 2023-07-25 RX ADMIN — ATORVASTATIN CALCIUM 40 MG: 40 TABLET, FILM COATED ORAL at 17:27

## 2023-07-25 RX ADMIN — SENNOSIDES AND DOCUSATE SODIUM 2 TABLET: 50; 8.6 TABLET ORAL at 05:06

## 2023-07-25 ASSESSMENT — ENCOUNTER SYMPTOMS
FOCAL WEAKNESS: 0
VOMITING: 0
HEADACHES: 0
CHILLS: 0
ABDOMINAL PAIN: 0
FEVER: 0
SENSORY CHANGE: 1
NAUSEA: 0
PALPITATIONS: 0
MYALGIAS: 0
SHORTNESS OF BREATH: 0
ORTHOPNEA: 0
BLURRED VISION: 0
COUGH: 0
DIZZINESS: 0
SPEECH CHANGE: 0

## 2023-07-25 ASSESSMENT — PAIN DESCRIPTION - PAIN TYPE
TYPE: ACUTE PAIN
TYPE: ACUTE PAIN

## 2023-07-25 NOTE — CARE PLAN
The patient is Stable - Low risk of patient condition declining or worsening    Shift Goals  Clinical Goals: Monitor trop level and other labs, comfort  Patient Goals: Rest and good sleep.  Family Goals: GURWINDER    Progress made toward(s) clinical / shift goals:    Problem: Knowledge Deficit - Standard  Goal: Patient and family/care givers will demonstrate understanding of plan of care, disease process/condition, diagnostic tests and medications  Outcome: Progressing  Note: Pt will have better understanding about her disease process, medications and treatments.     Problem: Pain - Standard  Goal: Alleviation of pain or a reduction in pain to the patient’s comfort goal  Outcome: Progressing  Flowsheets (Taken 7/25/2023 0330)  Non Verbal Scale: Calm  OB Pain Intervention:   Medication - See MAR   Rest  Pain Rating Scale (NPRS): 0  Note: Pt denies pain scale further reach it's comfortable scale 0/10 promote non pharmacological interventions.     Problem: Diabetes Management  Goal: Patient will achieve and maintain glucose in satisfactory range  Outcome: Progressing  Note: Monitor blood glucose level, watch out for any s/s of hypo and hyperglycemia.       Patient is not progressing towards the following goals:

## 2023-07-25 NOTE — HOSPITAL COURSE
Candy Zamarripa is a 78 y.o. female who presented 7/24/2023 with subacute onset of left sided numbness and elevated troponin at outside hospital. CT head and CT angio neck from outside hospital were negative. Her blood pressures were elevated to the 200s upon presentation. EKG was unremarkable. Troponinx2 was stable at 44. She was initiated on treatment for hypertensive emergency with home BP meds with the addition of amlodipine. Echo demonstrated normal systolic dysfunction with mild diastolic dysfunction.

## 2023-07-25 NOTE — PROGRESS NOTES
At 1015 pt reported left sided tightness across her body. No changes in pain numbness or strength. Pt Aox4. VSS. MD notified and came to bedside. New orders to be placed in Epic by MD.

## 2023-07-25 NOTE — DIETARY
NUTRITION SERVICES: BMI - Pt with BMI >40 (=Body mass index is 54.85 kg/m².), Class III obesity. Weight loss counseling not appropriate in acute care setting.     RECOMMEND - If appropriate at DC please refer to outpatient nutrition services for weight management.

## 2023-07-25 NOTE — PROGRESS NOTES
Pt on floor. Pt reports no acute pain, nausea, vomiting, at this time. AOx4. Pt states numbness throughout entire left side of body and chronic low back pain. Updated pt on plan of care. Pt resting comfortably in bed. Fall precautions and education done. Educated on use of call light which is placed nearby patient.  Questions and concerns answered at this time. Patient reports no other needs at the moment.

## 2023-07-25 NOTE — PROGRESS NOTES
[facility] Internal Medicine Daily Progress Note    Date of Service  7/25/2023    R Team: R IM Green Team   Attending: Ranjan Abdullahi M.d.  Senior Resident: Dr. Schwab  Intern:  Dr. Kidd  Contact Number: [phone]    Chief Complaint  Candy Zamarripa is a 78 y.o. female admitted 7/24/2023 with hypertensive emergency.     Hospital Course  Candy Zamarripa is a 78 y.o. female who presented 7/24/2023 with subacute onset of left sided numbness and elevated troponin at outside hospital. CT head and CT angio neck from outside hospital were negative. Her blood pressures were elevated to the 200s upon presentation. EKG was unremarkable. Troponinx2 was stable at 44. She was initiated on treatment for hypertensive emergency with home BP meds with the addition of amlodipine.       Interval Problem Update  No acute events overnight. Patient reports she continues to have left sided numbness but otherwise feels fine. She denied chest pain or any shortness of breath.   She reported her hands felt tight around 10am and was nervous. Repeat EKG was ordered which showed no ischemic changes and sinus bradycardia.   She underwent an ECHO and is pending brain MRI.     I have discussed this patient's plan of care and discharge plan at IDT rounds today with Case Management, Nursing, Nursing leadership, and other members of the IDT team.    Consultants/Specialty  cardiology    Code Status  Full Code    Disposition  The patient is not medically cleared for discharge to home or a post-acute facility.      I have placed the appropriate orders for post-discharge needs.    Review of Systems  Review of Systems   Constitutional:  Negative for chills and fever.   Eyes:  Negative for blurred vision.   Respiratory:  Negative for cough and shortness of breath.    Cardiovascular:  Negative for chest pain, palpitations, orthopnea and leg swelling.   Gastrointestinal:  Negative for abdominal pain, nausea and vomiting.   Musculoskeletal:  Negative  for myalgias.   Neurological:  Positive for sensory change. Negative for dizziness, speech change, focal weakness and headaches.        Physical Exam  Temp:  [36.1 °C (97 °F)-36.9 °C (98.5 °F)] 36.1 °C (97 °F)  Pulse:  [54-72] 62  Resp:  [11-20] 18  BP: (121-223)/() 169/62  SpO2:  [92 %-97 %] 95 %    Physical Exam  HENT:      Head: Normocephalic.      Nose: Nose normal.   Eyes:      General: No scleral icterus.        Right eye: No discharge.         Left eye: No discharge.   Cardiovascular:      Rate and Rhythm: Normal rate and regular rhythm.      Pulses: Normal pulses.      Heart sounds: Normal heart sounds.   Pulmonary:      Effort: Pulmonary effort is normal.      Breath sounds: Normal breath sounds.   Abdominal:      Tenderness: There is no abdominal tenderness.   Musculoskeletal:         General: No swelling.      Cervical back: Normal range of motion.   Skin:     Coloration: Skin is not jaundiced.   Neurological:      General: No focal deficit present.      Mental Status: She is alert and oriented to person, place, and time.      Sensory: No sensory deficit.      Motor: No weakness.   Psychiatric:      Comments: She is nervous.          Fluids    Intake/Output Summary (Last 24 hours) at 7/25/2023 1150  Last data filed at 7/24/2023 2200  Gross per 24 hour   Intake 150 ml   Output --   Net 150 ml       Laboratory  Recent Labs     07/24/23  1250 07/25/23  0622   WBC 8.3 7.0   RBC 4.42 4.35   HEMOGLOBIN 13.2 13.4   HEMATOCRIT 41.5 40.5   MCV 93.9 93.1   MCH 29.9 30.8   MCHC 31.8* 33.1   RDW 50.4* 50.8*   PLATELETCT 226 215   MPV 11.0 10.7     Recent Labs     07/24/23  1250 07/25/23  0622   SODIUM 137 138   POTASSIUM 4.1 3.6   CHLORIDE 102 103   CO2 25 26   GLUCOSE 125* 128*   BUN 13 12   CREATININE 0.81 0.86   CALCIUM 9.0 8.9             Recent Labs     07/25/23  0622   TRIGLYCERIDE 76   HDL 66   LDL 61       Imaging  EC-ECHOCARDIOGRAM COMPLETE W/O CONT         OUTSIDE IMAGES-CT HEAD   Final Result       OUTSIDE IMAGES-CT HEAD   Final Result      OUTSIDE IMAGES-DX CHEST   Final Result      MR-BRAIN-W/O    (Results Pending)        Assessment/Plan  Problem Representation:    * Demand ischemia (HCC)- (present on admission)  Assessment & Plan  Patient had elevated troponin at outside hospital and her initial troponin here was elevated at 44.  Her initial blood pressures in the ED were constantly elevated above 180 for systolic. This is most likely as a result of demand ischemia from hypertensive emergency. Her EKG demonstrated no ischemic changes. Patient also complains of left sided numbness but her neurological exam is unremarkable and outside head CT and CT angio neck were negative for acute processes. This is most likely as a result of her elevated blood pressures but we will get an MRI brain to rule out a thalamic stroke.   - MRI brain to rule out thalamic infarct   - Hydralazine 10mg PRN for systolic blood pressures over 180  - Her home blood pressure regimen includes ramipril and HCTZ. We will continue those and also start amlodipine 2.5mg.   - Atorvastatin and aspirin   - She reports exertional dyspnea after walking for short distances. We will evaluate with an echo to assess ventricular function.   - Cardiology consulted and they do not believe this is concerning for ACS and have signed off.     Hyperlipidemia  Assessment & Plan  - Atorvastatin 40mg       DM (diabetes mellitus) ([facility])  Assessment & Plan  Will hold home metformin, glipizide and pioglitazone.  - Start patient on SSI  - HgA1c in am    Hypertension  Assessment & Plan  Patient had hypertensive emergency in the ED. Her systolic blood pressures have been better controlled after addition of her home regimen.   - Hydralazine 10mg PRN for systolic BP over 180  - Continue home ramipril and HCTZ. Adding amlodipine 2.5mg.   - Her creatine was 0.80 upon admission and UA did not show any proteinuria          VTE prophylaxis: Heparin     I have performed a  physical exam and reviewed and updated ROS and Plan today (7/25/2023). In review of yesterday's note (7/24/2023), there are no changes except as documented above.

## 2023-07-25 NOTE — CARE PLAN
"The patient is Watcher - Medium risk of patient condition declining or worsening    Shift Goals  Clinical Goals: comfort, pt/ot eval, BP control  Patient Goals: comfort, rest  Family Goals: not applicable      Problem: Knowledge Deficit - Standard  Goal: Patient and family/care givers will demonstrate understanding of plan of care, disease process/condition, diagnostic tests and medications  Outcome: Progressing  Note: Pt educated on current POC, expected outcomes and goals, and new medications ordered. All questions and concerns have been answered at this time. Echo/MRI pending today, pt updated on results. EKG done due to new right sided \"tightness\" per pt, MD's aware and have been at bedside.      Problem: Diabetes Management  Goal: Patient will achieve and maintain glucose in satisfactory range  Outcome: Progressing  Note: Pt's has not needed insulin coverage today and values reflect levels at home, pt is aware of the need to consistent keep a healthy blood sugar value.          "

## 2023-07-25 NOTE — PROGRESS NOTES
4 Eyes Skin Assessment Completed by Mayuri, FELIBERTO and FELIBERTO Ngo.    Head WDL  Ears WDL  Nose WDL  Mouth WDL  Neck WDL  Breast/Chest WDL  Shoulder Blades WDL  Spine Redness  (R) Arm/Elbow/Hand WDL  (L) Arm/Elbow/Hand Bruising  Abdomen WDL  Groin WDL  Scrotum/Coccyx/Buttocks WDL  (R) Leg WDL  (L) Leg WDL  (R) Heel/Foot/Toe WDL  (L) Heel/Foot/Toe WDL          Devices In Places None      Interventions In Place Pillows and Pressure Redistribution Mattress    Possible Skin Injury No    Pictures Uploaded Into Epic N/A  Wound Consult Placed N/A  RN Wound Prevention Protocol Ordered No

## 2023-07-26 ENCOUNTER — APPOINTMENT (OUTPATIENT)
Dept: RADIOLOGY | Facility: MEDICAL CENTER | Age: 78
End: 2023-07-26
Payer: COMMERCIAL

## 2023-07-26 LAB
ANION GAP SERPL CALC-SCNC: 9 MMOL/L (ref 7–16)
BUN SERPL-MCNC: 12 MG/DL (ref 8–22)
CALCIUM SERPL-MCNC: 8.9 MG/DL (ref 8.5–10.5)
CHLORIDE SERPL-SCNC: 101 MMOL/L (ref 96–112)
CO2 SERPL-SCNC: 26 MMOL/L (ref 20–33)
CREAT SERPL-MCNC: 0.9 MG/DL (ref 0.5–1.4)
ERYTHROCYTE [DISTWIDTH] IN BLOOD BY AUTOMATED COUNT: 50.2 FL (ref 35.9–50)
EST. AVERAGE GLUCOSE BLD GHB EST-MCNC: 137 MG/DL
GFR SERPLBLD CREATININE-BSD FMLA CKD-EPI: 65 ML/MIN/1.73 M 2
GLUCOSE BLD STRIP.AUTO-MCNC: 121 MG/DL (ref 65–99)
GLUCOSE BLD STRIP.AUTO-MCNC: 123 MG/DL (ref 65–99)
GLUCOSE BLD STRIP.AUTO-MCNC: 154 MG/DL (ref 65–99)
GLUCOSE SERPL-MCNC: 131 MG/DL (ref 65–99)
HBA1C MFR BLD: 6.4 % (ref 4–5.6)
HCT VFR BLD AUTO: 41.2 % (ref 37–47)
HGB BLD-MCNC: 13.2 G/DL (ref 12–16)
MAGNESIUM SERPL-MCNC: 1.7 MG/DL (ref 1.5–2.5)
MCH RBC QN AUTO: 29.9 PG (ref 27–33)
MCHC RBC AUTO-ENTMCNC: 32 G/DL (ref 32.2–35.5)
MCV RBC AUTO: 93.4 FL (ref 81.4–97.8)
PLATELET # BLD AUTO: 212 K/UL (ref 164–446)
PMV BLD AUTO: 10.5 FL (ref 9–12.9)
POTASSIUM SERPL-SCNC: 3.5 MMOL/L (ref 3.6–5.5)
RBC # BLD AUTO: 4.41 M/UL (ref 4.2–5.4)
SODIUM SERPL-SCNC: 136 MMOL/L (ref 135–145)
WBC # BLD AUTO: 7 K/UL (ref 4.8–10.8)

## 2023-07-26 PROCEDURE — 36415 COLL VENOUS BLD VENIPUNCTURE: CPT

## 2023-07-26 PROCEDURE — A9270 NON-COVERED ITEM OR SERVICE: HCPCS | Performed by: STUDENT IN AN ORGANIZED HEALTH CARE EDUCATION/TRAINING PROGRAM

## 2023-07-26 PROCEDURE — 80048 BASIC METABOLIC PNL TOTAL CA: CPT

## 2023-07-26 PROCEDURE — 97162 PT EVAL MOD COMPLEX 30 MIN: CPT

## 2023-07-26 PROCEDURE — 96372 THER/PROPH/DIAG INJ SC/IM: CPT

## 2023-07-26 PROCEDURE — 82962 GLUCOSE BLOOD TEST: CPT

## 2023-07-26 PROCEDURE — 700102 HCHG RX REV CODE 250 W/ 637 OVERRIDE(OP)

## 2023-07-26 PROCEDURE — 70551 MRI BRAIN STEM W/O DYE: CPT

## 2023-07-26 PROCEDURE — G0378 HOSPITAL OBSERVATION PER HR: HCPCS

## 2023-07-26 PROCEDURE — 700111 HCHG RX REV CODE 636 W/ 250 OVERRIDE (IP): Performed by: STUDENT IN AN ORGANIZED HEALTH CARE EDUCATION/TRAINING PROGRAM

## 2023-07-26 PROCEDURE — A9270 NON-COVERED ITEM OR SERVICE: HCPCS

## 2023-07-26 PROCEDURE — 85027 COMPLETE CBC AUTOMATED: CPT

## 2023-07-26 PROCEDURE — 99232 SBSQ HOSP IP/OBS MODERATE 35: CPT | Mod: GC | Performed by: INTERNAL MEDICINE

## 2023-07-26 PROCEDURE — 83735 ASSAY OF MAGNESIUM: CPT

## 2023-07-26 PROCEDURE — 700102 HCHG RX REV CODE 250 W/ 637 OVERRIDE(OP): Performed by: STUDENT IN AN ORGANIZED HEALTH CARE EDUCATION/TRAINING PROGRAM

## 2023-07-26 RX ADMIN — HYDROCHLOROTHIAZIDE 25 MG: 25 TABLET ORAL at 05:08

## 2023-07-26 RX ADMIN — HEPARIN SODIUM 5000 UNITS: 5000 INJECTION, SOLUTION INTRAVENOUS; SUBCUTANEOUS at 14:29

## 2023-07-26 RX ADMIN — HEPARIN SODIUM 5000 UNITS: 5000 INJECTION, SOLUTION INTRAVENOUS; SUBCUTANEOUS at 05:08

## 2023-07-26 RX ADMIN — HEPARIN SODIUM 5000 UNITS: 5000 INJECTION, SOLUTION INTRAVENOUS; SUBCUTANEOUS at 21:35

## 2023-07-26 RX ADMIN — ATORVASTATIN CALCIUM 40 MG: 40 TABLET, FILM COATED ORAL at 17:26

## 2023-07-26 RX ADMIN — ASPIRIN 81 MG: 81 TABLET, COATED ORAL at 05:08

## 2023-07-26 RX ADMIN — AMLODIPINE BESYLATE 2.5 MG: 5 TABLET ORAL at 05:08

## 2023-07-26 RX ADMIN — RAMIPRIL 10 MG: 5 CAPSULE ORAL at 05:08

## 2023-07-26 ASSESSMENT — COGNITIVE AND FUNCTIONAL STATUS - GENERAL
SUGGESTED CMS G CODE MODIFIER MOBILITY: CI
CLIMB 3 TO 5 STEPS WITH RAILING: A LITTLE
MOBILITY SCORE: 23

## 2023-07-26 ASSESSMENT — GAIT ASSESSMENTS
DEVIATION: BRADYKINETIC
DISTANCE (FEET): 200
GAIT LEVEL OF ASSIST: SUPERVISED

## 2023-07-26 ASSESSMENT — ENCOUNTER SYMPTOMS
FEVER: 0
DIZZINESS: 0
VOMITING: 0
MYALGIAS: 0
CHILLS: 0
BLURRED VISION: 0
HEADACHES: 0
FOCAL WEAKNESS: 0
SENSORY CHANGE: 1
ABDOMINAL PAIN: 0
PALPITATIONS: 0
NAUSEA: 0
SPEECH CHANGE: 0
ORTHOPNEA: 0
COUGH: 0
SHORTNESS OF BREATH: 0

## 2023-07-26 ASSESSMENT — PATIENT HEALTH QUESTIONNAIRE - PHQ9
2. FEELING DOWN, DEPRESSED, IRRITABLE, OR HOPELESS: NOT AT ALL
1. LITTLE INTEREST OR PLEASURE IN DOING THINGS: NOT AT ALL
SUM OF ALL RESPONSES TO PHQ9 QUESTIONS 1 AND 2: 0

## 2023-07-26 ASSESSMENT — PAIN DESCRIPTION - PAIN TYPE: TYPE: ACUTE PAIN

## 2023-07-26 NOTE — PROGRESS NOTES
Received report, assumed pt care. Pt awake and up in the restroom without any needs. Call light within reach.

## 2023-07-26 NOTE — PROGRESS NOTES
Received report, assumed pt care. Pt a&o 4, VSS, assessment completed. Resting comfortably in bed with call light, bedside table in reach. No c/o at this time. Side rails up 2. Instructed to use call light when needing assistance verbalized understanding. Bed alarm n/a, bed in low position. Will continue to monitor.

## 2023-07-26 NOTE — PROGRESS NOTES
[facility] Internal Medicine Daily Progress Note    Date of Service  7/26/2023    R Team: R IM Green Team   Attending: Ranjan Abdullahi M.d.  Senior Resident: Dr. Schwab  Intern:  Dr. Kidd  Contact Number: [phone]    Chief Complaint  Candy Zamarripa is a 78 y.o. female admitted 7/24/2023 with hypertensive emergency.     Hospital Course  Candy Zamarripa is a 78 y.o. female who presented 7/24/2023 with subacute onset of left sided numbness and elevated troponin at outside hospital. CT head and CT angio neck from outside hospital were negative. Her blood pressures were elevated to the 200s upon presentation. EKG was unremarkable. Troponinx2 was stable at 44. She was initiated on treatment for hypertensive emergency with home BP meds with the addition of amlodipine. Echo demonstrated normal systolic dysfunction with mild diastolic dysfunction.       Interval Problem Update  No acute events overnight. Patient was anxious this morning in the room and a little teary. She feels stressed because she does not know what's causing numbness on her left side. She also states her ex- passed away in April and thinks that is worsening her anxiousness.   She is awaiting brain MRI.     I have discussed this patient's plan of care and discharge plan at IDT rounds today with Case Management, Nursing, Nursing leadership, and other members of the IDT team.    Consultants/Specialty  cardiology    Code Status  Full Code    Disposition  The patient is not medically cleared for discharge to home or a post-acute facility.      I have placed the appropriate orders for post-discharge needs.    Review of Systems  Review of Systems   Constitutional:  Negative for chills and fever.   Eyes:  Negative for blurred vision.   Respiratory:  Negative for cough and shortness of breath.    Cardiovascular:  Negative for chest pain, palpitations, orthopnea and leg swelling.   Gastrointestinal:  Negative for abdominal pain, nausea and vomiting.    Musculoskeletal:  Negative for myalgias.   Neurological:  Positive for sensory change. Negative for dizziness, speech change, focal weakness and headaches.        Physical Exam  Temp:  [36.2 °C (97.1 °F)-37 °C (98.6 °F)] 36.2 °C (97.2 °F)  Pulse:  [56-60] 56  Resp:  [16-20] 16  BP: (128-154)/(58-76) 148/76  SpO2:  [91 %-95 %] 92 %    Physical Exam  HENT:      Head: Normocephalic.      Nose: Nose normal.   Eyes:      General: No scleral icterus.        Right eye: No discharge.         Left eye: No discharge.   Cardiovascular:      Rate and Rhythm: Normal rate and regular rhythm.      Pulses: Normal pulses.      Heart sounds: Normal heart sounds.   Pulmonary:      Effort: Pulmonary effort is normal.      Breath sounds: Normal breath sounds.   Abdominal:      Tenderness: There is no abdominal tenderness.   Musculoskeletal:         General: No swelling.      Cervical back: Normal range of motion.   Skin:     Coloration: Skin is not jaundiced.   Neurological:      General: No focal deficit present.      Mental Status: She is alert and oriented to person, place, and time.      Sensory: No sensory deficit.      Motor: No weakness.   Psychiatric:      Comments: Anxious and teary-eyed.          Fluids    Intake/Output Summary (Last 24 hours) at 7/26/2023 1147  Last data filed at 7/25/2023 2130  Gross per 24 hour   Intake 200 ml   Output --   Net 200 ml         Laboratory  Recent Labs     07/24/23  1250 07/25/23  0622 07/26/23  0752   WBC 8.3 7.0 7.0   RBC 4.42 4.35 4.41   HEMOGLOBIN 13.2 13.4 13.2   HEMATOCRIT 41.5 40.5 41.2   MCV 93.9 93.1 93.4   MCH 29.9 30.8 29.9   MCHC 31.8* 33.1 32.0*   RDW 50.4* 50.8* 50.2*   PLATELETCT 226 215 212   MPV 11.0 10.7 10.5       Recent Labs     07/24/23  1250 07/25/23  0622 07/26/23  0752   SODIUM 137 138 136   POTASSIUM 4.1 3.6 3.5*   CHLORIDE 102 103 101   CO2 25 26 26   GLUCOSE 125* 128* 131*   BUN 13 12 12   CREATININE 0.81 0.86 0.90   CALCIUM 9.0 8.9 8.9               Recent Labs      07/25/23  0622   TRIGLYCERIDE 76   HDL 66   LDL 61         Imaging  EC-ECHOCARDIOGRAM COMPLETE W/O CONT   Final Result      OUTSIDE IMAGES-CT HEAD   Final Result      OUTSIDE IMAGES-CT HEAD   Final Result      OUTSIDE IMAGES-DX CHEST   Final Result      MR-BRAIN-W/O    (Results Pending)          Assessment/Plan  Problem Representation:    * Demand ischemia (HCC)- (present on admission)  Assessment & Plan  Patient had elevated troponin at outside hospital and her initial troponin here was elevated at 44.  Her initial blood pressures in the ED were constantly elevated above 180 for systolic. This is most likely as a result of demand ischemia from hypertensive emergency. Her EKG demonstrated no ischemic changes. Patient also complains of left sided numbness but her neurological exam is unremarkable and outside head CT and CT angio neck were negative for acute processes. This is most likely as a result of her elevated blood pressures but we will get an MRI brain to rule out a thalamic stroke.   - MRI brain to rule out thalamic infarct   - Hydralazine 10mg PRN for systolic blood pressures over 180  - Her home blood pressure regimen includes ramipril and HCTZ. We will continue those and also start amlodipine 2.5mg.   - Atorvastatin and aspirin   - She reports exertional dyspnea after walking for short distances. Echo demonstrated normal systolic function with mild diastolic dysfunction.   - Cardiology consulted and they do not believe this is concerning for ACS and have signed off.     Hyperlipidemia  Assessment & Plan  - Atorvastatin 40mg       DM (diabetes mellitus) ([facility])  Assessment & Plan  Will hold home metformin, glipizide and pioglitazone.  - Start patient on SSI  - A1c 6.5% (7/25)    Hypertension  Assessment & Plan  Patient had hypertensive emergency in the ED. Her systolic blood pressures have been better controlled after addition of her home regimen.   - Hydralazine 10mg PRN for systolic BP over 180  - Continue  home ramipril and HCTZ. Adding amlodipine 2.5mg.   - Her creatine was 0.80 upon admission and UA did not show any proteinuria          VTE prophylaxis: Heparin     I have performed a physical exam and reviewed and updated ROS and Plan today (7/26/2023). In review of yesterday's note (7/25/2023), there are no changes except as documented above.

## 2023-07-26 NOTE — CARE PLAN
The patient is Stable - Low risk of patient condition declining or worsening    Shift Goals  Clinical Goals: hemodynamic health.  Patient Goals: Comfort  Family Goals: X    Progress made toward(s) clinical / shift goals:    Problem: Knowledge Deficit - Standard  Goal: Patient and family/care givers will demonstrate understanding of plan of care, disease process/condition, diagnostic tests and medications  Outcome: Progressing  Note: Patient will have better understanding about her treatment,medications and plan of care.     Problem: Pain - Standard  Goal: Alleviation of pain or a reduction in pain to the patient’s comfort goal  Outcome: Progressing  Flowsheets (Taken 7/26/2023 4698)  Non Verbal Scale: Calm  OB Pain Intervention: Rest  Pain Rating Scale (NPRS): 0  Note: Pain scale remains to be at pain comfortable level, encourage non pharmacological interventions. Assess pain level per protocol.     Problem: Diabetes Management  Goal: Patient will achieve and maintain glucose in satisfactory range  Outcome: Progressing  Note: Educate patient about Diabetes, what to watch out for, healthy diet and lifestyle related to Diabetes.       Patient is not progressing towards the following goals:

## 2023-07-26 NOTE — CARE PLAN
The patient is Stable - Low risk of patient condition declining or worsening    Shift Goals  Clinical Goals: MRI brain, PT/OT  Patient Goals: MRI  Family Goals: GURWINDER    Progress made toward(s) clinical / shift goals:  Rest    Problem: Knowledge Deficit - Standard  Goal: Patient and family/care givers will demonstrate understanding of plan of care, disease process/condition, diagnostic tests and medications  Outcome: Progressing  Note: POC: MRI brain, PT/OT. Pt aware and has no additional questions at this time.      Problem: Skin Integrity  Goal: Skin integrity is maintained or improved  Outcome: Progressing  Note: No skin breakdown noted, pt upself and independent. RN will continue to assess.        Patient is not progressing towards the following goals:

## 2023-07-26 NOTE — THERAPY
Physical Therapy   Initial Evaluation     Patient Name: Candy Zamarripa  Age:  78 y.o., Sex:  female  Medical Record #: [id number]  Today's Date: 7/26/2023     Precautions  Precautions: Fall Risk    Assessment  Patient is a 78 y.o. female with hx of HTN, HLD, and DM admitted from OSH with L sided numbness, demand ischemia, and hypertensive emergency. PT eval complete, pt currently presents at her functional baseline and completed all mobility at SPV level with no AD. Pt's LE strength, coordination, and vision are all intact, however she has total L side decreased sensation from head to toe with slightly weak LUE. OT to assess today with more details on UE function. Pt still pending MRI, which may explain pt's diffuse sensation changes, however she is doing well from a functional mobility stand point and did not have any LOB or breaks needing during the assessment today. Pt should be able to safely DC home with support from family if her condition is stable and her function does not worsen. Patient will not be actively followed for physical therapy services at this time, however may be seen if requested by physician for 1 more visit within 30 days to address any discharge or equipment needs. Pt encouraged to ambulate in [place] with nsg staff prior to DC. Pt educated to inform nsg/hospital staff if her sensation, strength, coordination, or vision changes.     Plan    Physical Therapy Initial Treatment Plan   Duration: Evaluation only    DC Equipment Recommendations: None  Discharge Recommendations: Recommend home health for continued physical therapy services         Vitals   O2 (LPM) 0   O2 Delivery Device None - Room Air   Pain 0 - 10 Group   Therapist Pain Assessment   (no c/o pain)   Non Verbal Descriptors   Non Verbal Scale  Calm   Prior Living Situation   Prior Services None   Housing / Facility 1 Story Apartment / Condo  (duplex)   Steps Into Home 2  (small steps)   Equipment Owned 4-Wheel Walker   Lives with -  "Patient's Self Care Capacity Alone and Able to Care For Self   Comments lives in [place]. reports having supportive local family   Prior Level of Functional Mobility   Bed Mobility Independent   Transfer Status Independent   Ambulation Independent   Ambulation Distance community distances   Assistive Devices Used 4-Wheel Walker  (for community distances only)   Stairs Independent   History of Falls   History of Falls Yes   Date of Last Fall   (reports a fall 2 months ago when her R knee \"gave out\", no fx or significant injury)   Cognition    Cognition / Consciousness WDL   Level of Consciousness Alert   Comments very pleasant and participatory, receptive to education   Strength Upper Body   Comments LUE grossly 4/5. OT to assess with more details   Active ROM Lower Body    Active ROM Lower Body  WDL   Strength Lower Body   Lower Body Strength  WDL   Sensation Lower Body   Lower Extremity Sensation   X   Comments decreased sensation throughout entire LLE. pt reports similar sensation deficit from her head to feet on her L side   Lower Body Muscle Tone   Lower Body Muscle Tone  WDL   Coordination Lower Body    Coordination Lower Body  WDL   Balance Assessment   Sitting Balance (Static) Good   Sitting Balance (Dynamic) Good   Standing Balance (Static) Fair +   Standing Balance (Dynamic) Fair   Weight Shift Sitting Good   Weight Shift Standing Fair   Comments no AD   Bed Mobility    Supine to Sit Supervised   Sit to Supine Supervised   Scooting Supervised   Gait Analysis   Gait Level Of Assist Supervised   Assistive Device None   Distance (Feet) 200   # of Times Distance was Traveled 1   Deviation Bradykinetic   # of Stairs Climbed 0  (NT, pt demonstrates strength to complete 2 stairs as needed)   Weight Bearing Status no restrictions   Comments distance limited by fatigue, pt states this is baseline   Functional Mobility   Sit to Stand Supervised   Bed, Chair, Wheelchair Transfer Supervised   Toilet Transfers " Supervised   Mobility no AD   How much difficulty does the patient currently have...   Turning over in bed (including adjusting bedclothes, sheets and blankets)? 4   Sitting down on and standing up from a chair with arms (e.g., wheelchair, bedside commode, etc.) 4   Moving from lying on back to sitting on the side of the bed? 4   How much help from another person does the patient currently need...   Moving to and from a bed to a chair (including a wheelchair)? 4   Need to walk in a hospital room? 4   Climbing 3-5 steps with a railing? 3   6 clicks Mobility Score 23   Activity Tolerance   Sitting Edge of Bed 10 min/post session   Standing 5 min   Comments no overt pain, SOB, or fatigue   Education Group   Education Provided Role of Physical Therapist   Role of Physical Therapist Patient Response Patient;Acceptance;Explanation;Verbal Demonstration   Physical Therapy Initial Treatment Plan    Duration Evaluation only   Problem List    Problems None   Anticipated Discharge Equipment and Recommendations   DC Equipment Recommendations None   Discharge Recommendations Recommend home health for continued physical therapy services   Interdisciplinary Plan of Care Collaboration   IDT Collaboration with  Nursing   Patient Position at End of Therapy Edge of Bed;Call Light within Reach;Tray Table within Reach;Phone within Reach   Collaboration Comments RN updated   Session Information   Date / Session Number  7/26- 1x only

## 2023-07-26 NOTE — THERAPY
Occupational Therapy Contact Note    Patient Name: Candy Zamarripa  Age:  78 y.o., Sex:  female  Medical Record #: [id number]  Today's Date: 7/26/2023 07/26/23 0815   Interdisciplinary Plan of Care Collaboration   Collaboration Comments Will HOLD this am pending MRI work up to rule out thalamic CVA, will follow up as able/appropriate

## 2023-07-27 ENCOUNTER — NON-PROVIDER VISIT (OUTPATIENT)
Dept: CARDIOLOGY | Facility: MEDICAL CENTER | Age: 78
End: 2023-07-27
Payer: COMMERCIAL

## 2023-07-27 VITALS
BODY MASS INDEX: 53.19 KG/M2 | HEART RATE: 54 BPM | TEMPERATURE: 97.3 F | SYSTOLIC BLOOD PRESSURE: 138 MMHG | WEIGHT: 281.75 LBS | OXYGEN SATURATION: 96 % | DIASTOLIC BLOOD PRESSURE: 72 MMHG | RESPIRATION RATE: 16 BRPM | HEIGHT: 61 IN

## 2023-07-27 DIAGNOSIS — I49.3 PVCS (PREMATURE VENTRICULAR CONTRACTIONS): ICD-10-CM

## 2023-07-27 DIAGNOSIS — I47.29 NSVT (NONSUSTAINED VENTRICULAR TACHYCARDIA) (HCC): ICD-10-CM

## 2023-07-27 DIAGNOSIS — I49.1 PREMATURE ATRIAL CONTRACTIONS: ICD-10-CM

## 2023-07-27 DIAGNOSIS — I47.10 SVT (SUPRAVENTRICULAR TACHYCARDIA) (HCC): ICD-10-CM

## 2023-07-27 LAB
ANION GAP SERPL CALC-SCNC: 13 MMOL/L (ref 7–16)
BUN SERPL-MCNC: 15 MG/DL (ref 8–22)
CALCIUM SERPL-MCNC: 8.8 MG/DL (ref 8.5–10.5)
CHLORIDE SERPL-SCNC: 99 MMOL/L (ref 96–112)
CO2 SERPL-SCNC: 23 MMOL/L (ref 20–33)
CREAT SERPL-MCNC: 0.83 MG/DL (ref 0.5–1.4)
ERYTHROCYTE [DISTWIDTH] IN BLOOD BY AUTOMATED COUNT: 50.3 FL (ref 35.9–50)
GFR SERPLBLD CREATININE-BSD FMLA CKD-EPI: 72 ML/MIN/1.73 M 2
GLUCOSE BLD STRIP.AUTO-MCNC: 131 MG/DL (ref 65–99)
GLUCOSE BLD STRIP.AUTO-MCNC: 136 MG/DL (ref 65–99)
GLUCOSE BLD STRIP.AUTO-MCNC: 140 MG/DL (ref 65–99)
GLUCOSE SERPL-MCNC: 129 MG/DL (ref 65–99)
HCT VFR BLD AUTO: 41.1 % (ref 37–47)
HGB BLD-MCNC: 13.3 G/DL (ref 12–16)
MCH RBC QN AUTO: 30.6 PG (ref 27–33)
MCHC RBC AUTO-ENTMCNC: 32.4 G/DL (ref 32.2–35.5)
MCV RBC AUTO: 94.7 FL (ref 81.4–97.8)
PLATELET # BLD AUTO: 196 K/UL (ref 164–446)
PMV BLD AUTO: 10.3 FL (ref 9–12.9)
POTASSIUM SERPL-SCNC: 3.5 MMOL/L (ref 3.6–5.5)
RBC # BLD AUTO: 4.34 M/UL (ref 4.2–5.4)
SODIUM SERPL-SCNC: 135 MMOL/L (ref 135–145)
WBC # BLD AUTO: 7.4 K/UL (ref 4.8–10.8)

## 2023-07-27 PROCEDURE — 700111 HCHG RX REV CODE 636 W/ 250 OVERRIDE (IP): Performed by: STUDENT IN AN ORGANIZED HEALTH CARE EDUCATION/TRAINING PROGRAM

## 2023-07-27 PROCEDURE — 36415 COLL VENOUS BLD VENIPUNCTURE: CPT

## 2023-07-27 PROCEDURE — 99204 OFFICE O/P NEW MOD 45 MIN: CPT | Performed by: PSYCHIATRY & NEUROLOGY

## 2023-07-27 PROCEDURE — 80048 BASIC METABOLIC PNL TOTAL CA: CPT

## 2023-07-27 PROCEDURE — 92523 SPEECH SOUND LANG COMPREHEN: CPT

## 2023-07-27 PROCEDURE — G0378 HOSPITAL OBSERVATION PER HR: HCPCS

## 2023-07-27 PROCEDURE — 97535 SELF CARE MNGMENT TRAINING: CPT

## 2023-07-27 PROCEDURE — A9270 NON-COVERED ITEM OR SERVICE: HCPCS

## 2023-07-27 PROCEDURE — 96372 THER/PROPH/DIAG INJ SC/IM: CPT

## 2023-07-27 PROCEDURE — 99239 HOSP IP/OBS DSCHRG MGMT >30: CPT | Mod: GC | Performed by: INTERNAL MEDICINE

## 2023-07-27 PROCEDURE — 85027 COMPLETE CBC AUTOMATED: CPT

## 2023-07-27 PROCEDURE — 92610 EVALUATE SWALLOWING FUNCTION: CPT

## 2023-07-27 PROCEDURE — A9270 NON-COVERED ITEM OR SERVICE: HCPCS | Performed by: STUDENT IN AN ORGANIZED HEALTH CARE EDUCATION/TRAINING PROGRAM

## 2023-07-27 PROCEDURE — 82962 GLUCOSE BLOOD TEST: CPT | Mod: 91

## 2023-07-27 PROCEDURE — 97165 OT EVAL LOW COMPLEX 30 MIN: CPT

## 2023-07-27 PROCEDURE — 700102 HCHG RX REV CODE 250 W/ 637 OVERRIDE(OP)

## 2023-07-27 PROCEDURE — 700102 HCHG RX REV CODE 250 W/ 637 OVERRIDE(OP): Performed by: STUDENT IN AN ORGANIZED HEALTH CARE EDUCATION/TRAINING PROGRAM

## 2023-07-27 RX ORDER — POTASSIUM CHLORIDE 20 MEQ/1
40 TABLET, EXTENDED RELEASE ORAL ONCE
Status: COMPLETED | OUTPATIENT
Start: 2023-07-27 | End: 2023-07-27

## 2023-07-27 RX ORDER — ATORVASTATIN CALCIUM 40 MG/1
40 TABLET, FILM COATED ORAL EVERY EVENING
Qty: 90 TABLET | Refills: 0 | Status: SHIPPED | OUTPATIENT
Start: 2023-07-27

## 2023-07-27 RX ORDER — ASPIRIN 81 MG/1
81 TABLET ORAL DAILY
Status: DISCONTINUED | OUTPATIENT
Start: 2023-07-27 | End: 2023-07-27 | Stop reason: HOSPADM

## 2023-07-27 RX ORDER — AMLODIPINE BESYLATE 2.5 MG/1
2.5 TABLET ORAL DAILY
Qty: 90 TABLET | Refills: 0 | Status: SHIPPED | OUTPATIENT
Start: 2023-07-28

## 2023-07-27 RX ORDER — ASPIRIN 300 MG/1
300 SUPPOSITORY RECTAL DAILY
Status: DISCONTINUED | OUTPATIENT
Start: 2023-07-27 | End: 2023-07-27

## 2023-07-27 RX ADMIN — AMLODIPINE BESYLATE 2.5 MG: 5 TABLET ORAL at 05:25

## 2023-07-27 RX ADMIN — HEPARIN SODIUM 5000 UNITS: 5000 INJECTION, SOLUTION INTRAVENOUS; SUBCUTANEOUS at 05:25

## 2023-07-27 RX ADMIN — HYDROCHLOROTHIAZIDE 25 MG: 25 TABLET ORAL at 05:25

## 2023-07-27 RX ADMIN — ASPIRIN 81 MG: 81 TABLET, COATED ORAL at 06:49

## 2023-07-27 RX ADMIN — HEPARIN SODIUM 5000 UNITS: 5000 INJECTION, SOLUTION INTRAVENOUS; SUBCUTANEOUS at 13:19

## 2023-07-27 RX ADMIN — RAMIPRIL 10 MG: 5 CAPSULE ORAL at 05:25

## 2023-07-27 RX ADMIN — ASPIRIN 81 MG: 81 TABLET, COATED ORAL at 05:25

## 2023-07-27 RX ADMIN — POTASSIUM CHLORIDE 40 MEQ: 1500 TABLET, EXTENDED RELEASE ORAL at 06:49

## 2023-07-27 RX ADMIN — SENNOSIDES AND DOCUSATE SODIUM 2 TABLET: 50; 8.6 TABLET ORAL at 05:25

## 2023-07-27 ASSESSMENT — FIBROSIS 4 INDEX: FIB4 SCORE: 2.12

## 2023-07-27 ASSESSMENT — COGNITIVE AND FUNCTIONAL STATUS - GENERAL
SUGGESTED CMS G CODE MODIFIER DAILY ACTIVITY: CJ
PERSONAL GROOMING: A LITTLE
TOILETING: A LITTLE
HELP NEEDED FOR BATHING: A LITTLE
DAILY ACTIVITIY SCORE: 21

## 2023-07-27 ASSESSMENT — ACTIVITIES OF DAILY LIVING (ADL): TOILETING: INDEPENDENT

## 2023-07-27 NOTE — CARE PLAN
The patient is Stable - Low risk of patient condition declining or worsening    Shift Goals  Clinical Goals: Pendng MRI result. PT/OT  Patient Goals: Rest, MRI result  Family Goals: GURWINDER    Progress made toward(s) clinical / shift goals:     Problem: Pain - Standard  Goal: Alleviation of pain or a reduction in pain to the patient’s comfort goal    Description  Target End Date: Prior to discharge or change in level of care     Document on Vitals flowsheet   1. Document pain using the appropriate pain scale per order or unit policy   2. Educate and implement non-pharmacologic comfort measures (i.e. relaxation, distraction, massage, cold/heat therapy, etc.)   3. Pain management medications as ordered   4. Reassess pain after pain med administration per policy   5. If opiods administered assess patient's response to pain medication is appropriate per POSS sedation scale 6. Follow pain management plan developed in collaboration with patient and interdisciplinary team (including palliative care or pain specialists if applicable)    Outcome: Progressing  Note: Pain reports no pain. Continued pain level assessment and monitoring all throughout shift.    Problem: Diabetes Management  Goal: Patient will achieve and maintain glucose in satisfactory range    Description  Target End Date: Prior to discharge or change in level of care   1. Monitor glucose levels per provider order   2. Assess for signs and symptoms of hyperglycemia (abdominal pain, bloating, nausea or vomiting)   3. Assess for signs and symptoms of hypoglycemia (anxiety, tremors, slurred speech, change in LOC, tachycardia, fatigue)   4. Monitor for early signs and symptoms of infection   5. Monitor daily weights   6. Consult to diabetes educator    Outcome: Progressing  Note: Patient on AC BG checks with sliding scale. Monitored blood glucose.Encouraged oral hydration.    Problem: Fall Risk  Goal: Patient will remain free from falls    Description  Target End  Date: Prior to discharge or change in level of care     Document interventions on the Chelsey Simpson Fall Risk Assessment   1. Assess for fall risk factors   2. Implement fall precautions    Outcome: Progressing  Note: Encouraged and instructed to call for assistance. Side rails up. Call button within reach. Walker as needed. On standby assists. Monitored for progressive weakness and motor deficit.

## 2023-07-27 NOTE — CARE PLAN
The patient is Stable - Low risk of patient condition declining or worsening    Shift Goals  Clinical Goals: ambulate  Patient Goals: rest  Family Goals: mayo    Progress made toward(s) clinical / shift goals:    Problem: Knowledge Deficit - Standard  Goal: Patient and family/care givers will demonstrate understanding of plan of care, disease process/condition, diagnostic tests and medications  Description: Target End Date:  1-3 days or as soon as patient condition allows    Document in Patient Education    1.  Patient and family/caregiver oriented to unit, equipment, visitation policy and means for communicating concern  2.  Complete/review Learning Assessment  3.  Assess knowledge level of disease process/condition, treatment plan, diagnostic tests and medications  4.  Explain disease process/condition, treatment plan, diagnostic tests and medications  Outcome: Met     Problem: Pain - Standard  Goal: Alleviation of pain or a reduction in pain to the patient’s comfort goal  Description: Target End Date:  Prior to discharge or change in level of care    Document on Vitals flowsheet    1.  Document pain using the appropriate pain scale per order or unit policy  2.  Educate and implement non-pharmacologic comfort measures (i.e. relaxation, distraction, massage, cold/heat therapy, etc.)  3.  Pain management medications as ordered  4.  Reassess pain after pain med administration per policy  5.  If opiods administered assess patient's response to pain medication is appropriate per POSS sedation scale  6.  Follow pain management plan developed in collaboration with patient and interdisciplinary team (including palliative care or pain specialists if applicable)  Outcome: Met     Problem: Diabetes Management  Goal: Patient will achieve and maintain glucose in satisfactory range  Description: Target End Date:  Prior to discharge or change in level of care    1.  Monitor glucose levels per provider order  2.  Assess for signs  and symptoms of hyperglycemia (abdominal pain, bloating, nausea or vomiting)  3.  Assess for signs and symptoms of hypoglycemia (anxiety, tremors, slurred speech, change in LOC, tachycardia, fatigue)  4.  Monitor for early signs and symptoms of infection  5.  Monitor daily weights  6.  Consult to diabetes educator  Outcome: Met     Problem: Discharge Planning - Diabetes  Goal: Patient's continuum of care needs will be met  Description: Target End Date:  Prior to discharge or change in level of care    1.  Identify potential discharge barriers on admission and throughout hospital stay  2.  Collaborate with Diabetes Educator, Case Management,  for discharge needs  3.  Involve patient and family/caregiver in discharge process  4.  Ensure patient and family/caregiver demonstrate understanding of diabetes education including signs and symptoms of hyper/hypoglycemia  5.  Ensure patient and family/caregiver demonstrate proper use of equipment - finger stick device, glucometer and Insulin  6.  Ensure flu and pneumonia vaccinations are addressed  7.  Ensure discharge medications and supplies are ordered and verify all are delivered by pharmacy  Outcome: Met     Problem: Skin Integrity - Diabetes  Goal: Patient's skin on legs and feet will remain intact while hospitalized  Description: Target End Date:  Prior to discharge or change in level of care    1. Examine lower extremities for skin lesions  2. Neurovascular assessment of lower extremities including sensation, temperature of skin, capillary refill  3. Wash feet daily with mild soap and water  Outcome: Met     Problem: Respiratory  Goal: Patient will achieve/maintain optimum respiratory ventilation and gas exchange  Description: Target End Date:  Prior to discharge or change in level of care    Document on Assessment flowsheet    1.  Assess and monitor rate, rhythm, depth and effort of respiration  2.  Breath sounds assessed qshift and/or as needed  3.   Assess O2 saturation, administer/titrate oxygen as ordered  4.  Position patient for maximum ventilatory efficiency  5.  Turn, cough, and deep breath with splinting to improve effectiveness  6.  Collaborate with RT to administer medication/treatments per order  7.  Encourage use of incentive spirometer and encourage patient to cough after use and utilize splinting techniques if applicable  8.  Airway suctioning  9.  Monitor sputum production for changes in color, consistency and frequency  10. Perform frequent oral hygiene  11. Alternate physical activity with rest periods  Outcome: Met     Problem: Fluid Balance or Risk for Fluid Volume Deficit  Goal: Patient will demonstrate adequate hydration and vital signs  Description: Target End Date:  Prior to discharge or change in level of care    1.  Monitor vital signs - monitor for hypotension and tachycardia, shallow, rapid respirations  2.  Monitor intake and output and report inadequate volumes to provider  3.  Daily weights per provider order  4.  Assess for signs of dehydration - peripheral pulses, capillary refill, color, body temperature, skin turgor, dry skin/mucous membranes  5.  Monitor electrolytes and acid/base balance  6.  Administer IV therapy as ordered  Outcome: Met     Problem: Skin Integrity  Goal: Skin integrity is maintained or improved  Description: Target End Date:  Prior to discharge or change in level of care    Document interventions on Skin Risk/Vinicius flowsheet groups and corresponding LDA    1.  Assess and monitor skin integrity, appearance and/or temperature  2.  Assess risk factors for impaired skin integrity and/or pressures ulcers  3.  Implement precautions to protect skin integrity in collaboration with interdisciplinary team  4.  Implement pressure ulcer prevention protocol if at risk for skin breakdown  5.  Confirm wound care consult if at risk for skin breakdown  6.  Ensure patient use of pressure relieving devices  (Low air loss bed,  waffle overlay, heel protectors, ROHO cushion, etc)  Outcome: Met     Problem: Fall Risk  Goal: Patient will remain free from falls  Description: Target End Date:  Prior to discharge or change in level of care    Document interventions on the Chelsey Simpson Fall Risk Assessment    1.  Assess for fall risk factors  2.  Implement fall precautions  Outcome: Met     Problem: Optimal Care of the Stroke Patient  Goal: Optimal emergency care for the stroke patient  Description: Target End Date:  End of day 1    Time of Onset    1.  Time of last known well obtained  2.  Patient and family/caregiver verbalize understanding of diagnosis, medications and testing  3.  NIHSS performed and documented, including date and time, for ischemic stroke patients prior to any acute recanalization therapy (thrombolytics or mechanical) or within 12 hours of arrival if no intervention is warranted  4.  Consults and referrals placed to appropriate departments    Medications Administration as Ordered:    1.  Implement appropriate reversal agents for INR greater than 1.5  2.  Pre-alteplase administration of antihypertensives for SBP >185 DBP >110  3.  Post-alteplase administration of antihypertensives for SBP >185, DBP >105  4.  Thrombolytic Therapy for qualifying ischemic stroke patients who arrive within 4.5 hours of time of Last Known Well. Thrombolytic therapy administered within 30 minutes or a documented reason for delay  Outcome: Met  Goal: Optimal acute care for the stroke patient  Description: Target End Date:  1 to 3 days    - Vital signs and neuro checks performed and documented per order  - NIHSS completed and documented per order  - Continuous telemetry monitoring for 72 hours or until discontinued by provider  - Head CT without contrast obtained  - Consideration of MRI/MRA  - MRI screening form completed in worklist if MRI ordered  - Echocardiogram with Bubble Study ordered/completed with consideration of LAUREN  - Carotid Doppler  ordered/completed (Not required if CTA of neck completed in ED)  - Lipid Panel obtained within 48 hours of admission  - PT, PTT, INR obtained per Anticoagulation orders (if applicable)  - Antithrombotic therapy by end of hospital day 2 for ischemic stroke. Provider must document reason if contraindicated.  - Venous Thromboembolism (VTE) Prophylaxis by end of hospital day 2 for ischemic and hemorrhagic stroke. Provider must document reason if contraindicated  - Dysphagia screen completed and documented prior to any PO intake. Patient to remain NPO until Speech Therapy evaluation if thrombolytic or thrombectomy performed  - Rehabilitation assessment including PT/OT/SLP evaluations for referral to Physical Medicine and Rehabilitation services. If none needed, provider needs to document reason  - Neurology consult placed  - Consideration of cardiology consult for cryptogenic strokes  Outcome: Met     Problem: Knowledge Deficit - Stroke Education  Goal: Patient's knowledge of stroke and risk factors will improve  Description: Target End Date:  1-3 days or as soon as patient condition allows    Document in Patient Education    1.  Stroke education booklet provided  2.  Education regarding EMS activation, need for follow up, medication prescribed at discharge, risk factors for stroke/lifestyle modifications, warning signs and symptoms of stroke provided  Outcome: Met     Problem: Psychosocial - Patient Condition  Goal: Patient's ability to verbalize feelings about condition will improve  Description: Target End Date:  Prior to discharge or change in level of care    1.  Discuss coping with medical condition and its effects  2.  Encourage patient participation in care  3.  Encourage acknowledgement of body changes and accompanying emotions  4.  Perform depression screening  Outcome: Met  Goal: Patient's ability to re-evaluate and adapt role responsibilities will improve  Description: Target End Date:  Prior to discharge or  change in level of care    1.  Assess family support  2.  Encourage support system participation in care  3.  Encouraged verbalization of feelings regarding caregiver responsibilities  4.  Discuss changes in role and responsibilities caused by patient's condition  Outcome: Met     Problem: Discharge Planning - Stroke  Goal: Ensure Stroke Core Measures are met prior to discharge  Description: Target End Date:  Prior to discharge or change in level of care    1. Patient discharged on antithrombotic therapy (Ischemic Stroke)  2. Patient discharged on intensive statin if LDL is greater than or equal to 70 mg/dl (Ischemic Stroke)  3. Patient discharged on anticoagulation therapy for patients with atrial fibrillation/flutter (Ischemic Stroke)  4. Smoking education/cessation provided if applicable  Outcome: Met  Goal: Patient’s continuum of care needs will be met  Description: Target End Date:  Prior to discharge or change in level of care    1.  Potential discharge barriers identified upon admission and throughout hospital stay  2.  Ensure appropriate referrals in place for follow up with specialists after discharge  3.  Ensure appropriate referrals in place for DMEs if applicable  4.  Collaboration with transitional care team and interdisciplinary team to meet discharge needs  5.  Involve patient and family/caregiver in prioritizing goals for discharge planning  6.  Educate patient and caregiver about discharge instructions, medications, and follow up appointments  7.  Referral placed to [facility]  8.  Assure orders and follow up appointments are made for outpatient extended cardiac monitoring if appropriate  Outcome: Met     Problem: Neuro Status  Goal: Neuro status will remain stable or improve  Description: Target End Date:  Prior to discharge or change in level of care    Document on Neuro assessment in the Assessment flowsheet    1.  Assess and monitor neurologic status per provider order/protocol/unit  policy  2.  Assess level of consciousness and orientation  3.  Assess for speech, dysarthria, dysphagia, facial symmetry  4.  Assess visual field, eye movements, gaze preference, pupil reaction and size  5.  Assess muscle strength and motor response in all four extremities  6.  Assess for sensation (numbness and tingling)  7.  Assess basic neuro reflexes (cough, gag, corneal)  8.  Identify changes in neuro status and report to provider for testing/treatment orders  Outcome: Met     Problem: Hemodynamic Monitoring  Goal: Patient's hemodynamics, fluid balance and neurologic status will be stable or improve  Description: Target End Date:  Prior to discharge or change in level of care    1.  Vital signs, pulse oximetry and cardiac monitor per provider order and/or policy  2.  Frequent pulse checks performed post thrombectomy  3.  Frequent monitoring for signs of bleeding post TPA administration  4.  Proper management of IV infusions  5.  Intake and output monitored per provider order  6.  Daily weight obtained per unit policy or provider order  7.  Peripheral pulses and capillary refill assessed as needed  8.  Monitor for signs/symptoms of excessive bleeding  9.  Body temperature assessed and fevers treated  10. Patient positioned for maximum circulation/cardiac output  Outcome: Met     Problem: Respiratory - Stroke Patient  Goal: Patient will achieve/maintain optimum respiratory rate/effort  Description: Target End Date:  Prior to discharge or change in level of care    Document on Assessment flowsheet    1.  Assess and monitor respiratory rate, rhythm, depth and effort of respiration  2.  Oxygenation assessed throughout shift (recommendation of >94% for new stroke patients)  3.  Oxygen administered and/or titrated per order  4.  Collaboration with RT to administer medication/treatments per order  5.  Patient educated on importance of turning, coughing, and deep breathing  6.  Patient positioned for maximum ventilatory  efficiency  7.  Airway suctioning provided as needed  8.  Incentive spirometry encouraged 5-10 times every hour or while awake  Outcome: Met     Problem: Dysphagia  Goal: Dysphagia will improve  Description: Target End Date:  Prior to discharge or change in level of care    1.  Assess and monitor ability to swallow  2.  Collaborate with Speech Therapy to determine appropriate adaptation for safe administration of medications and oral nutrition  3.  Elevate head of bed to 90 degrees during feedings and for 30 minutes after each feeding  4.  Encourage proper swallowing techniques  5.  Screening on admission or as soon as possible  Outcome: Met     Problem: Risk for Aspiration  Goal: Patient's risk for aspiration will be absent or decrease  Description: Target End Date:  Prior to discharge or change in level of care    1.   Complete dysphagia screening on admission  2.   NPO until dysphagia screening complete or medically cleared  3.   Collaborate with Speech Therapy, Clinical Dietitian and interdisciplinary team  4.   Implement aspiration precautions  5.   Assist patient up to chair for meals  6.   Elevate head of bed 90 degrees if patient is unable to get out of bed  7.   Encourage small bites  8.   Ensure foods/liquids are of appropriate consistency  9.   Assess for any signs/symptoms of aspiration  10. Assess breath sounds and vital signs after oral intake  Outcome: Met     Problem: Urinary Elimination  Goal: Establish and maintain regular urinary output  Description: Target End Date:  Prior to discharge or change in level of care    Document on I/O and Assessment flowsheets    1.  Evaluate need to continue indwelling catheter every shift  2.  Assess signs and symptoms of urinary retention  3.  Assess post-void residual volumes  4.  Implement bladder training program  5.  Encourage scheduled voidings  6.  Assist patient to sit on bedside commode or toilet for voiding  7.  Educate patient and family/caregiver on use  and purpose of urine collection devices (document in Patient Education)  Outcome: Met     Problem: Bowel Elimination  Goal: Establish and maintain regular bowel function  Description: Target End Date:  Prior to discharge or change in level of care    1.   Note date of last BM  2.   Educate about diet, fluid intake, medication and activity to promote bowel function  3.   Educate signs and symptoms of constipation and interventions to implement  4.   Pharmacologic bowel management per provider order  5.   Regular toileting schedule  6.   Upright position for toileting  7.   High fiber diet  8.   Encourage hydration  9.   Collaborate with Clinical Dietician  10. Care and maintenance of ostomy if applicable  Outcome: Met     Problem: Mobility - Stroke  Goal: Patient's capacity to carry out activities will improve  Description: Target End Date:  Prior to discharge or change in level of care    1.  Assess for barriers to mobility/activity  2.  Implement activity per interdisciplinary team recommendations  3.  Target activity level identified and patient/family/caregiver aware of goal  4.  Provide assistive devices  5.  Instruct patient/caregiver on proper use of assistive/adaptive devices  6.  Schedule activities and rest periods to decrease effects of fatigue  7.  Encourage mobilization to extent of ability  8.  Maintain proper body alignment  9.  Provide adequate pain management to allow progressive mobilization  10. Implement pace maker precautions as needed  Outcome: Met  Goal: Spasticity will be prevented or improved  Description: Target End Date:  Prior to discharge or change in level of care    1.  Muscle relaxing agents considered or administered per order  2.  Splints applied properly and used accordingly to therapist's recommendations  3.  Assistance with stretching and range of motion exercises provided  Outcome: Met  Goal: Subluxation will be prevented or improved  Description: Target End Date:  Prior to  discharge or change in level of care    1.   Ensure proper handling during transfers, ambulation, and repositioning in bed  2.   Reduce traction to affected limb and provide adequate support of weight  3.   Perform passive range of motion  4.  Assist with active range of motion  Outcome: Met     Problem: Self Care  Goal: Patient will have the ability to perform ADLs independently or with assistance (bathe, groom, dress, toilet and feed)  Description: Target End Date:  Prior to discharge or change in level of care    Document on ADL flowsheet    1.  Assess the capability and level of deficiency to perform ADLs  2.  Encourage family/care giver involvement  3.  Provide assistive devices  4.  Consider PT/OT evaluations  5.  Maintain support, give positive feedback, encourage self-care allowing extra time and verbal cuing as needed  6.  Avoid doing something for patients they can do themselves, but provide assistance as needed  7.  Assist in anticipating/planning individual needs  8.  Collaborate with Case Management and  to meet discharge needs  Outcome: Met       Patient is not progressing towards the following goals:

## 2023-07-27 NOTE — DISCHARGE PLANNING
[facility] Transitional Care Coordination    Referral from:  Bernabe  Insurance Provider on Facesheet:Nstemi/ Stroke  Potential Rehab Diagnosis:     Chart review indicates patient may have on going medical management and may have therapy needs to possibly meet inpatient rehab facility criteria with the goal of returning to community.    D/C support: need to verify d/c support     Physiatry consultation denied , therapy notes do not support IPR level of care per CMS guidelines .  T[facility] not following.          Thank you for the referral.

## 2023-07-27 NOTE — DISCHARGE PLANNING
Case Management Discharge Planning    Admission Date: 7/24/2023  GMLOS:    ALOS: 0    6-Clicks ADL Score: 23  6-Clicks Mobility Score: 23      Anticipated Discharge Dispo: Discharge Disposition: Discharged to home/self care (01)  Discharge Address: [place], [place]    DME Needed: No    Action(s) Taken: DC Assessment Complete (See below)    LSW spoke to patient at bedside and confirmed demographic information. Patient lives alone in her home in [place]. Her daughter also lives in [place] a few houses down from her. Patient denies DME use, denies oxygen use, and denies assistance needed with ADLs. Patient's PCP is Dr. Binu Sierra.     LSW discussed no home health services found in [place], [place]. Patient said she isn't surprised as there's nothing out there. Patient was agreeable to outpatient scripts for PT.     Patient reports she has a granddaughter in [place] who she can discharge to when medically clear.     Escalations Completed: None    Medically Clear: Yes    Next Steps: LSW to follow up with patient and medical team regarding discharge needs and barriers.     Barriers to Discharge: Medical clearance  Care Transition Team Assessment    Information Source  Orientation Level: Oriented X4  Information Given By: Patient  Who is responsible for making decisions for patient? : Patient    Readmission Evaluation  Is this a readmission?: No    Elopement Risk  Legal Hold: No  Ambulatory or Self Mobile in Wheelchair: Yes  Disoriented: No  Psychiatric Symptoms: None  History of Wandering: No  Elopement this Admit: No  Vocalizing Wanting to Leave: No  Displays Behaviors, Body Language Wanting to Leave: No-Not at Risk for Elopement  Elopement Risk: Not at Risk for Elopement    Interdisciplinary Discharge Planning  Lives with - Patient's Self Care Capacity: Alone and Able to Care For Self  Patient or legal guardian wants to designate a caregiver: No  Support Systems: Family Member(s), Friends / Neighbors  Housing / Facility: 1 Story  Apartment / Condo (duplex)  Prior Services: None  Durable Medical Equipment: Walker    Discharge Preparedness  What is your plan after discharge?: Home with help  What are your discharge supports?: Child  Prior Functional Level: Independent with Activities of Daily Living, Independent with Medication Management    Functional Assesment  Prior Functional Level: Independent with Activities of Daily Living, Independent with Medication Management    Finances  Financial Barriers to Discharge: No  Prescription Coverage: Yes    Vision / Hearing Impairment  Vision Impairment : Yes  Right Eye Vision: Impaired, Wears Glasses  Left Eye Vision: Wears Glasses, Impaired  Hearing Impairment : No         Advance Directive  Advance Directive?: None    Domestic Abuse  Have you ever been the victim of abuse or violence?: No  Physical Abuse or Sexual Abuse: No  Verbal Abuse or Emotional Abuse: No  Possible Abuse/Neglect Reported to:: Not Applicable         Discharge Risks or Barriers  Discharge risks or barriers?: No    Anticipated Discharge Information  Discharge Disposition: Discharged to home/self care (01)  Discharge Address: [address]

## 2023-07-27 NOTE — CONSULTS
Neurology STROKE CODE H&P  Neurohospitalist Service, [facility]    Referring Physician: Ranjan Abdullahi M.D.    STROKE CODE:   Chief Complaint   Patient presents with    Numbness     Left arm numbness/tingling radiating to L face since Saturday at 1130.        To obtain the most accurate data regarding the time called, and time patient seen, refer to the stroke run-sheet and chart.  For time of CT, refer to the radiology report. See A&P below for TPA Decision and door to needle time if and when applicable.    HPI: 78-year-old female was transferred from outside facility due to concerns for an NSTEMI.  Patient complains of having left-sided numbness since 22 July.  MRI brain was performed yesterday which showed acute infarct over the right thalamus but also additional infarct in the right parietal, parietal occipital and left occipital.  Troponin elevations felt to be due to troponin leak.  Patient is severely hypertensive on arrival here a few days ago over 200 systolic.  She also carries diagnosis of diabetes.  And hyperlipidemia.  No history of infarct.  On aspirin currently.  Patient still continues to have left-sided numbness.  Denies significant weakness.  TTE was unremarkable.  EKG shows bradycardia x2    Review of systems: In addition to what is detailed in the HPI above, (and scanned into the chart if and when applicable), all other systems reviewed and are negative.    Past Medical History:    has a past medical history of Arthritis, Diabetes (HCC), and Hypertension.    FHx:  family history includes Heart Attack in her father; Lung Cancer in her mother and sister; Stroke in her father.    SHx:   reports that she has never smoked. She has never been exposed to tobacco smoke. She has never used smokeless tobacco. She reports that she does not drink alcohol and does not use drugs.    Allergies:  Allergies   Allergen Reactions    Meperidine Rash             Medications:    Current  Facility-Administered Medications:     aspirin EC tablet 81 mg, 81 mg, Oral, DAILY, Issa Kidd M.D., 81 mg at 07/27/23 0649    senna-docusate (Pericolace Or Senokot S) 8.6-50 MG per tablet 2 Tablet, 2 Tablet, Oral, BID, 2 Tablet at 07/27/23 0525 **AND** polyethylene glycol/lytes (Miralax) PACKET 1 Packet, 1 Packet, Oral, QDAY PRN **AND** magnesium hydroxide (Milk Of Magnesia) suspension 30 mL, 30 mL, Oral, QDAY PRN **AND** bisacodyl (Dulcolax) suppository 10 mg, 10 mg, Rectal, QDAY PRN, Rodrigo Schwab M.D.    heparin injection 5,000 Units, 5,000 Units, Subcutaneous, Q8HRS, Rodrigo Schwab M.D., 5,000 Units at 07/27/23 0525    acetaminophen (Tylenol) tablet 650 mg, 650 mg, Oral, Q6HRS PRN, Rodrigo Schwab M.D.    ondansetron (Zofran) syringe/vial injection 4 mg, 4 mg, Intravenous, Q4HRS PRN, Rodrigo Schwab M.D.    ondansetron (Zofran ODT) dispertab 4 mg, 4 mg, Oral, Q4HRS PRN, Rodrigo Schwab M.D.    POC blood glucose manual result, , , Q AC AND BEDTIME(S) **AND** NOTIFY MD and PharmD, , , Once **AND** Administer 20 grams of glucose (approximately 8 ounces of fruit juice) every 15 minutes PRN FSBG less than 70 mg/dL, , , PRN **AND** dextrose 50% (D50W) injection 25 g, 25 g, Intravenous, Q15 MIN PRN, Rodrigo Schwab M.D.    insulin lispro (HumaLOG,AdmeLOG) injection, 2-9 Units, Subcutaneous, TID AC, Rodrigo Schwab M.D.    hydroCHLOROthiazide (Hydrodiuril) tablet 25 mg, 25 mg, Oral, Q DAY, Rodrigo Schwab M.D., 25 mg at 07/27/23 0525    ramipril (Altace) capsule 10 mg, 10 mg, Oral, Q DAY, Rodrigo Schwab M.D., 10 mg at 07/27/23 0525    hydrALAZINE (Apresoline) injection 10 mg, 10 mg, Intravenous, Q6HRS PRN, Issa Kidd M.D.    amLODIPine (Norvasc) tablet 2.5 mg, 2.5 mg, Oral, Q DAY, Issa Kidd M.D., 2.5 mg at 07/27/23 0525    atorvastatin (Lipitor) tablet 40 mg, 40 mg, Oral, Q EVENING, Issa Kidd M.D., 40 mg at 07/26/23 1726    Physical Examination:    Vitals:    07/27/23 0401 07/27/23 0522 07/27/23  0523 07/27/23 0814   BP: (!) 142/58  (!) 143/61 138/72   Pulse: (!) 59  (!) 55 (!) 54   Resp: 18   16   Temp: 36.1 °C (97 °F)   36.3 °C (97.3 °F)   TempSrc: Temporal   Temporal   SpO2: 92%   96%   Weight:  (!) 128 kg (281 lb 12 oz)     Height:           General: Patient is awake and in no acute distress  Eyes: Cannot visualize fundus  CV: RRR    NEUROLOGICAL EXAM:     Mental status: Awake, alert and fully oriented, follows commands  Speech and language: speech is clear and fluent. The patient is able to name and repeat.  Cranial nerve exam: Pupils are equal reactive.  Midline.  No gaze preference.  Face shows possibly a very slight left facial droop.  Sensation intact.  Hearing intact.  Visual field intact.  Shoulder shrug intact.  Tongue midline.    Motor exam: Sustained antigravity and in all 4.  Slight weakness on hand  on the left  Sensory exam: Decree sensation on the left arm and leg soft touch compared to right  Deep tendon reflexes: Toes are downgoing bilaterally   coordination: no ataxia   Gait: Normal  NIH Stroke Scale:    1a. Level of Consciousness (Alert, drowsy, etc): 0= Alert    1b. LOC Questions (Month, age): 0= Answers both correctly    1c. LOC Commands (Open/close eyes make fist/let go): 0= Obeys both correctly    2.   Best Gaze (Eyes open - patient follows examiner's finger on face): 0= Normal    3.   Visual Fields (introduce visual stimulus/threat to patient's field quadrants): 0= No visual loss  4.   Facial Paresis (Show teeth, raise eyebrows and squeeze eyes shut): 1= Minor     5a. Motor Arm - Left (Elevate arm to 90 degrees if patient is sitting, 45 degrees if  supine): 0= No drift    5b. Motor Arm - Right (Elevate arm to 90 degrees if patient is sitting, 45 degrees if supine): 0= No drift    6a. Motor Leg - Left (Elevate leg 30 degrees with patient supine): 0= No drift    6b. Motor Leg - Right  (Elevate leg 30 degrees with patient supine): 0= No drift    7.   Limb Ataxia (Finger-nose, heel  down shin): 0= No ataxia    8.   Sensory (Pin prick to face, arm, trunk and leg - compare side to side): 1= Partial loss    9.  Best Language (Name item, describe a picture and read sentences): 0= No aphasia    10. Dysarthria (Evaluate speech clarity by patient repeating listed words): 0= Normal articulation    11. Extinction and Inattention (Use information from prior testing to identify neglect or  double simultaneous stimuli testing): 0= No neglect    Total NIH Score: 2    Modified Larimer Scale (MRS): 0 = No symptoms      Objective Data:    Labs:  No results found for: PROTHROMBTM, INR   Lab Results   Component Value Date/Time    WBC 7.4 07/27/2023 04:27 AM    RBC 4.34 07/27/2023 04:27 AM    HEMOGLOBIN 13.3 07/27/2023 04:27 AM    HEMATOCRIT 41.1 07/27/2023 04:27 AM    MCV 94.7 07/27/2023 04:27 AM    MCH 30.6 07/27/2023 04:27 AM    MCHC 32.4 07/27/2023 04:27 AM    MPV 10.3 07/27/2023 04:27 AM    NEUTSPOLYS 59.40 07/25/2023 06:22 AM    LYMPHOCYTES 29.70 07/25/2023 06:22 AM    MONOCYTES 8.10 07/25/2023 06:22 AM    EOSINOPHILS 2.30 07/25/2023 06:22 AM    BASOPHILS 0.40 07/25/2023 06:22 AM      Lab Results   Component Value Date/Time    SODIUM 135 07/27/2023 04:27 AM    POTASSIUM 3.5 (L) 07/27/2023 04:27 AM    CHLORIDE 99 07/27/2023 04:27 AM    CO2 23 07/27/2023 04:27 AM    GLUCOSE 129 (H) 07/27/2023 04:27 AM    BUN 15 07/27/2023 04:27 AM    CREATININE 0.83 07/27/2023 04:27 AM      Lab Results   Component Value Date/Time    CHOLSTRLTOT 142 07/25/2023 06:22 AM    LDL 61 07/25/2023 06:22 AM    HDL 66 07/25/2023 06:22 AM    TRIGLYCERIDE 76 07/25/2023 06:22 AM       Lab Results   Component Value Date/Time    ALKPHOSPHAT 58 07/25/2023 06:22 AM    ASTSGOT 22 07/25/2023 06:22 AM    ALTSGPT 17 07/25/2023 06:22 AM    TBILIRUBIN 0.4 07/25/2023 06:22 AM        Imaging/Testing:  MR-BRAIN-W/O   Final Result         Multiple small foci of acute infarction in the right parietal region with a more confluent area of acute infarction  in the right medial parieto-occipital region and in the right thalamus. Also noted is a small area of acute infarction in the left medial    occipital region.      Nonspecific T2 hyperintensities are noted in the periventricular and deep white matter, most likely related to chronic microvascular ischemia.      EC-ECHOCARDIOGRAM COMPLETE W/O CONT   Final Result      OUTSIDE IMAGES-CT HEAD   Final Result      OUTSIDE IMAGES-CT HEAD   Final Result      OUTSIDE IMAGES-DX CHEST   Final Result        Independent review of the outside CTA head and neck that was done 3 days ago appears unremarkable no critical stenosis or significant atherosclerotic disease    Assessment and Plan:    78-year-old female presented with left-sided numbness that started 5 days ago.  No acute intervention indicated at this time.  Found to have multiple small infarcts on MRI brain yesterday afternoon.  These infarcts are in different vascular territories.  With the unremarkable CTA head and neck this pattern infarcts raises concern of a cardioembolic source.  Recommend long-term cardiac monitoring.  Continue secondary stroke risk modifications including maintaining LDL below 70, maintain blood pressure in the normal range, maintain normoglycemia, continue aspirin.  Patient can follow-up in stroke clinic.  Neurology will sign off at this time.        The evaluation of the patient, and recommended management, was discussed with the resident staff.     This chart was partially generated using voice recognition technology and sound alike word replacement may be present, best efforts were made to make the chart accurate.    Jan Roldan MD  Board Certified Neurology, ABPN  (t) [phone]

## 2023-07-27 NOTE — DISCHARGE INSTRUCTIONS
Discharge Instructions    Discharged to home by car with friend. Discharged via wheelchair, hospital escort: Yes.  Special equipment needed: holter monitor  Be sure to schedule a follow-up appointment with your primary care doctor or any specialists as instructed.     Discharge Plan:   Diet Plan: Discussed  Activity Level: Discussed  Confirmed Follow up Appointment: Appointment Scheduled  Confirmed Symptoms Management: Discussed  Medication Reconciliation Updated: Yes    I understand that a diet low in cholesterol, fat, and sodium is recommended for good health. Unless I have been given specific instructions below for another diet, I accept this instruction as my diet prescription.   Other diet: regular    Special Instructions: None    -Is this patient being discharged with medication to prevent blood clots?  No    Is patient discharged on Warfarin / Coumadin?   No

## 2023-07-27 NOTE — THERAPY
"Speech Language Pathology   Clinical Swallow Evaluation     Patient Name: Candy Zamarripa  AGE:  78 y.o., SEX:  female  Medical Record #: [id number]  Date of Service: 7/27/2023      History of Present Illness  79 YO Female admitted with subacute onset L-sided numbness. CT(-). No hx SLP in Epic.     PMHx: HTN, HLD, DMII    MRI Brain w/o 7/26:  \"Multiple small foci of acute infarction in the right parietal region with a more confluent area of acute infarction in the right medial parieto-occipital region and in the right thalamus. Also noted is a small area of acute infarction in the left medial   occipital region.     Nonspecific T2 hyperintensities are noted in the periventricular and deep white matter, most likely related to chronic microvascular ischemia.\"      General Information:  Vitals  O2 Delivery Device: None - Room Air  Level of Consciousness: Alert, Awake  Patient Behaviors:  (Calm, cooperative)  Orientation: Oriented x 4  Follows Directives: Yes      Prior Living Situation & Level of Function:  Prior Services: None  Comments: I with IADLs at baseline  Education:  (Retired -  and group home)  Communication: WFL  Swallowing: WFL       Oral Mechanism Evaluation:  Dentition: Fair, Some missing dentition, Natural dentition   Facial Symmetry: Equal  Facial Sensation: Impaired - left (Numb but still sensate)     Labial Observations: WFL   Lingual Observations: Midline  Motor Speech: WFL            Laryngeal Function:  Secretion Management: Adequate  Voice Quality: WFL  Cough: Perceptually WNL         Subjective  Pt agreeable and cooperative with SLP evaluation tasks. Pt reports sufficient cognition and language for return to I with IADLs. OT reports that pt does plan to not tell her PCP that she had a stroke bc she wishes to continue driving      Assessment  Current Method of Nutrition: Oral diet (RG/TN)  Positioning: Sitting EOB  Bolus Administration: Patient    O2 Delivery Device: None - Room " "Air  Factor(s) Affecting Performance: None  Tracheostomy : No        Swallowing Trials:  Thin Liquid (TN0): WFL  Pureed (PU4): WFL  Easy to Chew (EC7): WFL      Comments: Pt w/ appropriate self-feeding. Appropriate oral bolus stripping and containment. Total AP transit and effective bolus formation. Functional and timely mastication of solid consistencies. No overt s/sx of aspiration noted w/ single and sequential sips of TN0 water. No increase in WOB, no cough/clear, no change in vocal quality. One to two swallows appreciated per bolus.       Clinical Impressions  Pt presents without s/sx concerning for oropharyngeal dysphagia this date. Recommend initiation of RG/TN diet with PO medication administration. Given that pt appears to be at baseline without dysphagia concern, SLP will not follow. Please re-consult SLP with s/sx concerning for aspiration or change in pt status.      Recommendations  Regular solids with thin liquids  Instrumentation: None indicated at this time  Medication: As tolerated  Supervision: Independent  Positioning: Fully upright and midline during oral intake  Risk Management : None  Oral Care: Q6h         SLP Treatment Plan  Treatment Plan: None Indicated  SLP Frequency: N/A - Evaluation Only  Estimated Duration: N/A - Evaluation Only      Anticipated Discharge Needs  Discharge Recommendations: Anticipate that the patient will have no further speech therapy needs after discharge from the hospital   Therapy Recommendations Upon DC: Not Indicated        Patient / Family Goals  Patient / Family Goal #1: \"I ate a cracker.\"         America Gomez, SLP   "

## 2023-07-27 NOTE — THERAPY
"Speech Language Pathology   Cognitive Evaluation     Patient Name: Candy Zamarripa  AGE:  78 y.o., SEX:  female  Medical Record #: [id number]  Date of Service: 7/27/2023      History of Present Illness  79 YO Female admitted with subacute onset L-sided numbness. CT(-). No hx SLP in Epic.      PMHx: HTN, HLD, DMII     MRI Brain w/o 7/26:  \"Multiple small foci of acute infarction in the right parietal region with a more confluent area of acute infarction in the right medial parieto-occipital region and in the right thalamus. Also noted is a small area of acute infarction in the left medial   occipital region.     Nonspecific T2 hyperintensities are noted in the periventricular and deep white matter, most likely related to chronic microvascular ischemia.\"    General Information  Vitals  O2 Delivery Device: None - Room Air  Level of Consciousness: Alert, Awake  Patient Behaviors:  (Calm, cooperative)  Orientation: Oriented x 4  Follows Directives: Yes      Prior Living Situation & Level of Function  Prior Services: None  Comments: I with IADLs at baseline  Education:  (Retired -  and group home)  Communication: WFL  Swallowing: WFL      Subjective  Pt agreeable and cooperative with SLP evaluation tasks. Pt reports sufficient cognition and language for return to I with IADLs. OT reports that pt does plan to not tell her PCP that she had a stroke bc she wishes to continue driving      Communication Domain(s)  Expressive Language: WFL  Receptive Language: WFL  Cognitive-Linguistic: Mild     Assessment  The patient was seen this date for a cognitive evaluation.      Cognistat  Orientation: Average  Attention: Mild  Comprehension: Average  Repetition: Average  Naming: Average  Memory: Average  Calculations: Average  Similarities: Mild  Judgement: Average      Medication Management  Pt demonstrated appropriate synthesis of information. Answered temporal and numerical reasoning tasks with 100% accuracy. Pt does report " "taking routine medications at home; described home medication management appropriately and in good detail. Also demonstrated appropriate understanding of risks with medications during this. Appropriate judgement and divergent thinking when asked to list questions they would want to ask about a newly prescribed medication.       Clock Drawing  Good organization with clock drawing task. Clock drawn scored 12/13 indicating WFL per CLQT protocol. Errors as follows: clock hands equal length.        Clinical Impressions  Pt presents with mild attention deficits per results of Cognistat. Suspect she will be able to successfully return to  with IADLs at baseline upon discharge from the hospital. Does not demonstrate need for skilled SLP service for cognition at this time.       NOTE: It is not within the scope of practice of Speech-Language Pathologists to determine patient capacity. Please defer to the physician or psych to complete this assessment.         Recommendations  Supervision Needs Upons Discharge: None  IADLs: N/A         SLP Treatment Plan  Treatment Plan: None Indicated  SLP Frequency: N/A - Evaluation Only  Estimated Duration: N/A - Evaluation Only      Anticipated Discharge Needs  Discharge Recommendations: Anticipate that the patient will have no further speech therapy needs after discharge from the hospital  Therapy Recommendations Upon DC: Not Indicated      Patient / Family Goals  Patient / Family Goal #1: \"I ate a cracker.\"         America Gomez, SLP  "

## 2023-07-27 NOTE — THERAPY
Occupational Therapy   Initial Evaluation     Patient Name: Candy Zamarripa  Age:  78 y.o., Sex:  female  Medical Record #: [id number]  Today's Date: 7/27/2023     Precautions  Precautions: Fall Risk  Comments: tingling/dulled sensation L side of body, anterior/posterior    Assessment  Patient is 78 y.o. female admitted for emergent hypertension, demand ischemia, and CVA w/u found to have acute infarcts in the R parietal region, R medial parieto-occipital region, R thalamus, and L medial occipital region. Completed ADLs/txfs with SBA-SPV and functional ambulation w/o AD and CGA; using walls/furniture for balance. Lives in [facility] [place]; reports has family locally who can assist, and a Christian friend goes with her to appointments in [facility] to assist. Patient will not be actively followed for occupational therapy services at this time, however may be seen if requested by physician for 1 more visit within 30 days to address any discharge or equipment needs.     Plan    Occupational Therapy Initial Treatment Plan   Duration: Discharge Needs Only    DC Equipment Recommendations: Tub / Shower Seat  Discharge Recommendations: Recommend home health for continued occupational therapy services (if able to get; should not be barrier to d/c)     Objective     07/27/23 1148   Prior Living Situation   Prior Services Home-Independent   Housing / Facility 1 Story Apartment / Condo  (duplex)   Steps Into Home 2   Bathroom Set up Bathtub / Shower Combination   Equipment Owned 4-Wheel Walker   Lives with - Patient's Self Care Capacity Alone and Able to Care For Self   Comments Lives in [facility] [place]; reports has family locally who can assist, and a Christian friend goes with her to appointments in [facility] to assist.   Prior Level of ADL Function   Self Feeding Independent   Grooming / Hygiene Independent   Bathing Independent   Dressing Independent   Toileting Independent   Prior Level of IADL Function   Medication Management Independent   Laundry  "Independent   Kitchen Mobility Independent   Finances Independent   Home Management Independent   Shopping Independent   Prior Level Of Mobility Independent Without Device in Community;Independent Without Device in Home   Driving / Transportation Driving Independent   History of Falls   History of Falls Yes   Date of Last Fall   (reports a fall 2 months ago when her R knee \"gave out\", no fx or significant injury)   Precautions   Precautions Fall Risk   Comments tingling/dulled sensation L side of body, anterior/posterior   Vitals   O2 Delivery Device None - Room Air   Pain 0 - 10 Group   Location Back   Therapist Pain Assessment During Activity;Nurse Notified;Post Activity   Cognition    Cognition / Consciousness X   Speech/ Communication Delayed Responses  (slightly delayed)   Level of Consciousness Alert   Comments very pleasant and cooperative; receptive to education   Passive ROM Upper Body   Passive ROM Upper Body WDL   Active ROM Upper Body   Active ROM Upper Body  WDL   Strength Upper Body   Upper Body Strength  WDL   Comments R slightly weaker than L, but pt reports she is at baseline strength   Sensation Upper Body   Upper Extremity Sensation  X   Comments Reports entire L side posterior/anterior is tingling/diminished from L cheek down to toes. Specifically reports tingling on ulnar side of fingers/hand/forearm, and diminished sensation on radial side of hand/forearm proximally to shoulder and down.   Coordination Upper Body   Coordination WDL   Comments WFL during opposition and nose to finger   Balance Assessment   Sitting Balance (Static) Good   Sitting Balance (Dynamic) Fair +   Standing Balance (Static) Fair   Standing Balance (Dynamic) Fair -   Weight Shift Sitting Good   Weight Shift Standing Fair   Comments w/o AD, pt using walls/furniture for balance, likely would benefit from AD   Bed Mobility    Supine to Sit Supervised   Scooting Supervised   Comments left EOB at request   ADL Assessment "   Eating Supervision   Grooming   (declined need)   Lower Body Dressing Contact Guard Assist  (reports has slip ons at home)   Toileting Supervision   Comments Educated pt on neural recovery, CVA, home safety/setup, balance deficits, sensory stimulation for LUE/hands, DME, adaptive techniques for txfs, cognitive fatigue, and importance of continued OOB activity.   mRS Prior to admission   Prior to admission mRS 0   Modified Newport (mRS)   Modified [place] Score 2   Functional Mobility   Sit to Stand Supervised   Bed, Chair, Wheelchair Transfer Supervised   Toilet Transfers Standby Assist   Transfer Method Stand Step   Mobility w/o AD; use of walls/furniture for balance likely would benefit from AD when notices balance is impaired   Visual Perception   Visual Perception  X   Comments L eye WFL, R eye w/o vision d/t prior eye/vision deficits (baseline)   Edema / Skin Assessment   Edema / Skin  Not Assessed   Activity Tolerance   Comments left sitting EOB; no c/o fatigue   Education Group   Education Provided Role of Occupational Therapist;Stroke;Transfers   Role of Occupational Therapist Patient Response Patient;Acceptance;Explanation;Verbal Demonstration;Reinforcement Needed   Transfers Patient Response Patient;Acceptance;Explanation;Verbal Demonstration;Reinforcement Needed   Stroke Patient Response Patient;Acceptance;Explanation;Verbal Demonstration;Reinforcement Needed

## 2023-07-27 NOTE — DISCHARGE SUMMARY
[facility] Discharge Summary    Attending: Ranjan Abdullahi M.d.  Senior Resident: Dr. Schwab  Intern:  Dr. Kidd  Contact Number: [phone]    CHIEF COMPLAINT ON ADMISSION  Chief Complaint   Patient presents with    Numbness     Left arm numbness/tingling radiating to L face since Saturday at 1130.        Reason for Admission  EMS     Admission Date  7/24/2023    CODE STATUS  Full Code    HPI & HOSPITAL COURSE  This is a 78 y.o. female here with a subacute onset of left sided numbness that began on 7/22. Patient initially went to an outside hospital in [place], [place] where her CTA neck and CT head were negative for any acute abnormalities. Due to elevated troponin levels at the outside facility, patient was worked up with an EKG and troponinx2 in the ED here which ruled out ACS. Her blood pressures were elevated to the 200s, and she was initiated on treatment for hypertensive emergency with her home blood pressure medications with the addition of amlodipine 2.5mg  as well as hydralazine PRN. Echo was ordered per cardiology recommendations which demonstrated normal systolic and mild diastolic dysfunction. Atorvastatin 40mg was started as there was concern for possible CVA. MRI of the brain demonstrated multiple acute infarcts in various vascular territories including the right thalamus. Neurology was consulted and recommended long-term cardiac monitoring as this was thought to be due to cardioembolic. On 7/27, patient was ready for discharge after evaluation with PT/OT. She will be discharged with outpatient PT/OT and a Zio patch monitor. Atorvastatin 40mg and amlodipine 2.5mg were added to her home medication regimen.       Therefore, she is discharged in good and stable condition to home with close outpatient follow-up.    The patient met 2-midnight criteria for an inpatient stay at the time of discharge.    Discharge Date  7/27/2023    Physical Exam on Day of Discharge  Physical  Exam  Constitutional:       General: She is not in acute distress.  HENT:      Head: Normocephalic.      Nose: Nose normal.      Mouth/Throat:      Mouth: Mucous membranes are moist.   Eyes:      General: No scleral icterus.        Right eye: No discharge.         Left eye: No discharge.      Extraocular Movements: Extraocular movements intact.      Pupils: Pupils are equal, round, and reactive to light.   Cardiovascular:      Rate and Rhythm: Normal rate and regular rhythm.      Pulses: Normal pulses.      Heart sounds: Normal heart sounds.   Pulmonary:      Effort: Pulmonary effort is normal.      Breath sounds: Normal breath sounds.   Abdominal:      Tenderness: There is no abdominal tenderness.   Musculoskeletal:         General: No swelling.      Cervical back: Normal range of motion.   Skin:     General: Skin is warm and dry.   Neurological:      Mental Status: She is alert and oriented to person, place, and time.      Cranial Nerves: No cranial nerve deficit.      Motor: No weakness.      Comments: Patient reports diminished LUE, LLE and left side of face sensation compared to right side         FOLLOW UP ITEMS POST DISCHARGE  Outpatient PT/OT  F/U in primary care clinic as needed       DISCHARGE DIAGNOSES  Principal Problem:    Demand ischemia (HCC) (POA: Yes)  Active Problems:    Hypertension (POA: Unknown)    DM (diabetes mellitus) (HCC) (POA: Unknown)    Hyperlipidemia (POA: Unknown)  Resolved Problems:    * No resolved hospital problems. *      FOLLOW UP  [facility] ([facility] - Primary Care and Family Medicine  [address] [place] [id number]  [phone]  Call in 2 week(s)  As needed      MEDICATIONS ON DISCHARGE     Medication List        START taking these medications        Instructions   amLODIPine 2.5 MG Tabs  Start taking on: July 28, 2023  Commonly known as: Norvasc   Take 1 Tablet by mouth every day.  Dose: 2.5 mg     atorvastatin 40 MG Tabs  Commonly known as: Lipitor    Take 1 Tablet by mouth every evening.  Dose: 40 mg            CONTINUE taking these medications        Instructions   aspirin 81 MG EC tablet   Take 81 mg by mouth at bedtime.  Dose: 81 mg     glipiZIDE 5 MG Tabs  Commonly known as: Glucotrol   Take 5 mg by mouth 2 times a day.  Dose: 5 mg     hydroCHLOROthiazide 25 MG Tabs  Commonly known as: Hydrodiuril   Take 25 mg by mouth every day.  Dose: 25 mg     levothyroxine 25 MCG Tabs  Commonly known as: Synthroid   Take 25 mcg by mouth every morning on an empty stomach.  Dose: 25 mcg     loratadine 10 MG Tabs  Commonly known as: Claritin   Take 10 mg by mouth every day.  Dose: 10 mg     metformin 1000 MG tablet  Commonly known as: Glucophage   Take 1,000 mg by mouth 2 times a day.  Dose: 1,000 mg     pioglitazone 30 MG Tabs  Commonly known as: Actos   Take 30 mg by mouth every day.  Dose: 30 mg     ramipril 10 MG capsule  Commonly known as: Altace   Take 10 mg by mouth at bedtime.  Dose: 10 mg     therapeutic multivitamin-minerals Tabs   Take 1 Tablet by mouth every day.  Dose: 1 Tablet     timolol 0.5 % Soln  Commonly known as: Timoptic   Administer 1 Drop into both eyes 2 times a day.  Dose: 1 Drop     Vitamin C 1000 MG Tabs   Take 1,000 mg by mouth every day.  Dose: 1,000 mg     vitamin D3 1000 Unit (25 mcg) Tabs  Commonly known as: Cholecalciferol   Take 1,000 Units by mouth every day.  Dose: 1,000 Units            STOP taking these medications      simvastatin 20 MG Tabs  Commonly known as: Zocor              Allergies  Allergies   Allergen Reactions    Meperidine Rash             DIET  Orders Placed This Encounter   Procedures    Diet Order Diet: Consistent CHO (Diabetic)     Standing Status:   Standing     Number of Occurrences:   1     Order Specific Question:   Diet:     Answer:   Consistent CHO (Diabetic) [4]    Discontinue Diet Tray     Standing Status:   Standing     Number of Occurrences:   1       ACTIVITY  As tolerated.  Weight bearing as  tolerated    CONSULTATIONS  Cardiology, Neurology     PROCEDURES  None    LABORATORY  Lab Results   Component Value Date    SODIUM 135 07/27/2023    POTASSIUM 3.5 (L) 07/27/2023    CHLORIDE 99 07/27/2023    CO2 23 07/27/2023    GLUCOSE 129 (H) 07/27/2023    BUN 15 07/27/2023    CREATININE 0.83 07/27/2023        Lab Results   Component Value Date    WBC 7.4 07/27/2023    HEMOGLOBIN 13.3 07/27/2023    HEMATOCRIT 41.1 07/27/2023    PLATELETCT 196 07/27/2023        Total time of the discharge process exceeds 45 minutes.

## 2023-08-16 ENCOUNTER — TELEPHONE (OUTPATIENT)
Dept: CARDIOLOGY | Facility: MEDICAL CENTER | Age: 78
End: 2023-08-16
Payer: COMMERCIAL

## 2023-08-16 DIAGNOSIS — R20.0 LEFT SIDED NUMBNESS: ICD-10-CM

## 2023-09-15 PROCEDURE — 93228 REMOTE 30 DAY ECG REV/REPORT: CPT | Performed by: INTERNAL MEDICINE

## 2023-10-01 RX ORDER — ATORVASTATIN CALCIUM 40 MG/1
40 TABLET, FILM COATED ORAL EVERY EVENING
Qty: 90 TABLET | Refills: 3 | OUTPATIENT
Start: 2023-10-01